# Patient Record
Sex: MALE | Race: WHITE | NOT HISPANIC OR LATINO | Employment: OTHER | URBAN - METROPOLITAN AREA
[De-identification: names, ages, dates, MRNs, and addresses within clinical notes are randomized per-mention and may not be internally consistent; named-entity substitution may affect disease eponyms.]

---

## 2022-10-12 ENCOUNTER — HOSPITAL ENCOUNTER (INPATIENT)
Facility: MEDICAL CENTER | Age: 67
LOS: 8 days | DRG: 331 | End: 2022-10-20
Attending: EMERGENCY MEDICINE | Admitting: SURGERY
Payer: MEDICARE

## 2022-10-12 DIAGNOSIS — Z93.2 ILEOSTOMY IN PLACE (HCC): ICD-10-CM

## 2022-10-12 DIAGNOSIS — K51.019 ULCERATIVE PANCOLITIS WITH COMPLICATION (HCC): ICD-10-CM

## 2022-10-12 DIAGNOSIS — K56.609 LARGE BOWEL OBSTRUCTION (HCC): ICD-10-CM

## 2022-10-12 DIAGNOSIS — G89.18 POST-OP PAIN: ICD-10-CM

## 2022-10-12 PROBLEM — K51.919 ULCERATIVE COLITIS WITH COMPLICATION (HCC): Status: ACTIVE | Noted: 2022-10-12

## 2022-10-12 LAB
ALBUMIN SERPL BCP-MCNC: 3.7 G/DL (ref 3.2–4.9)
ALBUMIN/GLOB SERPL: 1 G/DL
ALP SERPL-CCNC: 122 U/L (ref 30–99)
ALT SERPL-CCNC: 166 U/L (ref 2–50)
ANION GAP SERPL CALC-SCNC: 14 MMOL/L (ref 7–16)
APPEARANCE UR: CLEAR
AST SERPL-CCNC: 49 U/L (ref 12–45)
BACTERIA #/AREA URNS HPF: NEGATIVE /HPF
BASOPHILS # BLD AUTO: 0.2 % (ref 0–1.8)
BASOPHILS # BLD: 0.02 K/UL (ref 0–0.12)
BILIRUB SERPL-MCNC: 1.5 MG/DL (ref 0.1–1.5)
BILIRUB UR QL STRIP.AUTO: ABNORMAL
BLOOD CULTURE HOLD CXBCH: NORMAL
BUN SERPL-MCNC: 27 MG/DL (ref 8–22)
CALCIUM SERPL-MCNC: 9.3 MG/DL (ref 8.5–10.5)
CHLORIDE SERPL-SCNC: 100 MMOL/L (ref 96–112)
CO2 SERPL-SCNC: 22 MMOL/L (ref 20–33)
COLOR UR: ABNORMAL
CREAT SERPL-MCNC: 0.84 MG/DL (ref 0.5–1.4)
EOSINOPHIL # BLD AUTO: 0.01 K/UL (ref 0–0.51)
EOSINOPHIL NFR BLD: 0.1 % (ref 0–6.9)
EPI CELLS #/AREA URNS HPF: NEGATIVE /HPF
ERYTHROCYTE [DISTWIDTH] IN BLOOD BY AUTOMATED COUNT: 46 FL (ref 35.9–50)
GFR SERPLBLD CREATININE-BSD FMLA CKD-EPI: 95 ML/MIN/1.73 M 2
GLOBULIN SER CALC-MCNC: 3.6 G/DL (ref 1.9–3.5)
GLUCOSE SERPL-MCNC: 106 MG/DL (ref 65–99)
GLUCOSE UR STRIP.AUTO-MCNC: NEGATIVE MG/DL
HCT VFR BLD AUTO: 43.7 % (ref 42–52)
HGB BLD-MCNC: 15.2 G/DL (ref 14–18)
HYALINE CASTS #/AREA URNS LPF: ABNORMAL /LPF
IMM GRANULOCYTES # BLD AUTO: 0.04 K/UL (ref 0–0.11)
IMM GRANULOCYTES NFR BLD AUTO: 0.5 % (ref 0–0.9)
KETONES UR STRIP.AUTO-MCNC: 15 MG/DL
LEUKOCYTE ESTERASE UR QL STRIP.AUTO: ABNORMAL
LIPASE SERPL-CCNC: 58 U/L (ref 11–82)
LYMPHOCYTES # BLD AUTO: 1.29 K/UL (ref 1–4.8)
LYMPHOCYTES NFR BLD: 14.9 % (ref 22–41)
MCH RBC QN AUTO: 31.8 PG (ref 27–33)
MCHC RBC AUTO-ENTMCNC: 34.8 G/DL (ref 33.7–35.3)
MCV RBC AUTO: 91.4 FL (ref 81.4–97.8)
MICRO URNS: ABNORMAL
MONOCYTES # BLD AUTO: 1.29 K/UL (ref 0–0.85)
MONOCYTES NFR BLD AUTO: 14.9 % (ref 0–13.4)
NEUTROPHILS # BLD AUTO: 6 K/UL (ref 1.82–7.42)
NEUTROPHILS NFR BLD: 69.4 % (ref 44–72)
NITRITE UR QL STRIP.AUTO: NEGATIVE
NRBC # BLD AUTO: 0 K/UL
NRBC BLD-RTO: 0 /100 WBC
PH UR STRIP.AUTO: 5 [PH] (ref 5–8)
PLATELET # BLD AUTO: 394 K/UL (ref 164–446)
PMV BLD AUTO: 9.6 FL (ref 9–12.9)
POTASSIUM SERPL-SCNC: 4.4 MMOL/L (ref 3.6–5.5)
PROT SERPL-MCNC: 7.3 G/DL (ref 6–8.2)
PROT UR QL STRIP: NEGATIVE MG/DL
RBC # BLD AUTO: 4.78 M/UL (ref 4.7–6.1)
RBC # URNS HPF: ABNORMAL /HPF
RBC UR QL AUTO: NEGATIVE
SODIUM SERPL-SCNC: 136 MMOL/L (ref 135–145)
SP GR UR STRIP.AUTO: 1.03
UROBILINOGEN UR STRIP.AUTO-MCNC: 1 MG/DL
WBC # BLD AUTO: 8.7 K/UL (ref 4.8–10.8)
WBC #/AREA URNS HPF: ABNORMAL /HPF

## 2022-10-12 PROCEDURE — 81001 URINALYSIS AUTO W/SCOPE: CPT

## 2022-10-12 PROCEDURE — 700111 HCHG RX REV CODE 636 W/ 250 OVERRIDE (IP): Performed by: SURGERY

## 2022-10-12 PROCEDURE — 36415 COLL VENOUS BLD VENIPUNCTURE: CPT

## 2022-10-12 PROCEDURE — 770001 HCHG ROOM/CARE - MED/SURG/GYN PRIV*

## 2022-10-12 PROCEDURE — 99285 EMERGENCY DEPT VISIT HI MDM: CPT

## 2022-10-12 PROCEDURE — 85025 COMPLETE CBC W/AUTO DIFF WBC: CPT

## 2022-10-12 PROCEDURE — 700105 HCHG RX REV CODE 258: Performed by: SURGERY

## 2022-10-12 PROCEDURE — 700105 HCHG RX REV CODE 258: Performed by: EMERGENCY MEDICINE

## 2022-10-12 PROCEDURE — 80053 COMPREHEN METABOLIC PANEL: CPT

## 2022-10-12 PROCEDURE — 83690 ASSAY OF LIPASE: CPT

## 2022-10-12 RX ORDER — PREDNISONE 20 MG/1
20 TABLET ORAL 2 TIMES DAILY
Status: DISCONTINUED | OUTPATIENT
Start: 2022-10-13 | End: 2022-10-15

## 2022-10-12 RX ORDER — OXYCODONE HYDROCHLORIDE 5 MG/1
2.5 TABLET ORAL
Status: DISCONTINUED | OUTPATIENT
Start: 2022-10-12 | End: 2022-10-15

## 2022-10-12 RX ORDER — PREDNISONE 20 MG/1
20 TABLET ORAL 2 TIMES DAILY
Status: ON HOLD | COMMUNITY
End: 2022-10-20

## 2022-10-12 RX ORDER — OXYCODONE HYDROCHLORIDE 5 MG/1
5 TABLET ORAL
Status: DISCONTINUED | OUTPATIENT
Start: 2022-10-12 | End: 2022-10-15

## 2022-10-12 RX ORDER — PREDNISONE 20 MG/1
40 TABLET ORAL DAILY
Status: DISCONTINUED | OUTPATIENT
Start: 2022-10-12 | End: 2022-10-12

## 2022-10-12 RX ORDER — ONDANSETRON 2 MG/ML
4 INJECTION INTRAMUSCULAR; INTRAVENOUS EVERY 4 HOURS PRN
Status: DISCONTINUED | OUTPATIENT
Start: 2022-10-12 | End: 2022-10-15

## 2022-10-12 RX ORDER — TRAMADOL HYDROCHLORIDE 50 MG/1
50 TABLET ORAL 4 TIMES DAILY PRN
Status: ON HOLD | COMMUNITY
End: 2022-10-20

## 2022-10-12 RX ORDER — MESALAMINE 800 MG/1
1600 TABLET, DELAYED RELEASE ORAL 2 TIMES DAILY
Status: ON HOLD | COMMUNITY
End: 2022-10-20

## 2022-10-12 RX ORDER — MERCAPTOPURINE 50 MG/1
50 TABLET ORAL
Status: ON HOLD | COMMUNITY
End: 2022-10-20

## 2022-10-12 RX ORDER — ACETAMINOPHEN 325 MG/1
650 TABLET ORAL EVERY 4 HOURS PRN
Status: DISCONTINUED | OUTPATIENT
Start: 2022-10-12 | End: 2022-10-15

## 2022-10-12 RX ORDER — CEPHALEXIN 500 MG/1
500 CAPSULE ORAL 4 TIMES DAILY
Status: ON HOLD | COMMUNITY
End: 2022-10-20

## 2022-10-12 RX ORDER — SODIUM CHLORIDE 9 MG/ML
INJECTION, SOLUTION INTRAVENOUS CONTINUOUS
Status: DISCONTINUED | OUTPATIENT
Start: 2022-10-12 | End: 2022-10-12

## 2022-10-12 RX ORDER — IBUPROFEN 600 MG/1
600 TABLET ORAL EVERY 6 HOURS PRN
Status: DISCONTINUED | OUTPATIENT
Start: 2022-10-12 | End: 2022-10-15

## 2022-10-12 RX ORDER — POLYETHYLENE GLYCOL 3350 17 G/17G
17 POWDER, FOR SOLUTION ORAL DAILY
Status: ON HOLD | COMMUNITY
End: 2022-10-20

## 2022-10-12 RX ORDER — SODIUM CHLORIDE, SODIUM LACTATE, POTASSIUM CHLORIDE, CALCIUM CHLORIDE 600; 310; 30; 20 MG/100ML; MG/100ML; MG/100ML; MG/100ML
INJECTION, SOLUTION INTRAVENOUS CONTINUOUS
Status: DISCONTINUED | OUTPATIENT
Start: 2022-10-12 | End: 2022-10-13

## 2022-10-12 RX ORDER — HYDROMORPHONE HYDROCHLORIDE 1 MG/ML
0.25 INJECTION, SOLUTION INTRAMUSCULAR; INTRAVENOUS; SUBCUTANEOUS
Status: DISCONTINUED | OUTPATIENT
Start: 2022-10-12 | End: 2022-10-15

## 2022-10-12 RX ORDER — SENNOSIDES A AND B 8.6 MG/1
8.6 TABLET, FILM COATED ORAL
Status: ON HOLD | COMMUNITY
End: 2022-10-20

## 2022-10-12 RX ADMIN — PREDNISONE 20 MG: 20 TABLET ORAL at 19:15

## 2022-10-12 RX ADMIN — SODIUM CHLORIDE: 9 INJECTION, SOLUTION INTRAVENOUS at 15:21

## 2022-10-12 RX ADMIN — SODIUM CHLORIDE, POTASSIUM CHLORIDE, SODIUM LACTATE AND CALCIUM CHLORIDE: 600; 310; 30; 20 INJECTION, SOLUTION INTRAVENOUS at 15:34

## 2022-10-12 ASSESSMENT — LIFESTYLE VARIABLES
TOTAL SCORE: 0
TOTAL SCORE: 0
HAVE PEOPLE ANNOYED YOU BY CRITICIZING YOUR DRINKING: NO
CONSUMPTION TOTAL: NEGATIVE
EVER HAD A DRINK FIRST THING IN THE MORNING TO STEADY YOUR NERVES TO GET RID OF A HANGOVER: NO
AVERAGE NUMBER OF DAYS PER WEEK YOU HAVE A DRINK CONTAINING ALCOHOL: 0
EVER FELT BAD OR GUILTY ABOUT YOUR DRINKING: NO
ON A TYPICAL DAY WHEN YOU DRINK ALCOHOL HOW MANY DRINKS DO YOU HAVE: 0
TOTAL SCORE: 0
HAVE YOU EVER FELT YOU SHOULD CUT DOWN ON YOUR DRINKING: NO
HOW MANY TIMES IN THE PAST YEAR HAVE YOU HAD 5 OR MORE DRINKS IN A DAY: 0
ALCOHOL_USE: NO
DOES PATIENT WANT TO STOP DRINKING: NO

## 2022-10-12 ASSESSMENT — COGNITIVE AND FUNCTIONAL STATUS - GENERAL
SUGGESTED CMS G CODE MODIFIER MOBILITY: CH
MOBILITY SCORE: 24
DAILY ACTIVITIY SCORE: 24
SUGGESTED CMS G CODE MODIFIER DAILY ACTIVITY: CH

## 2022-10-12 ASSESSMENT — PATIENT HEALTH QUESTIONNAIRE - PHQ9
2. FEELING DOWN, DEPRESSED, IRRITABLE, OR HOPELESS: NOT AT ALL
SUM OF ALL RESPONSES TO PHQ9 QUESTIONS 1 AND 2: 0
1. LITTLE INTEREST OR PLEASURE IN DOING THINGS: NOT AT ALL

## 2022-10-12 ASSESSMENT — PAIN DESCRIPTION - PAIN TYPE: TYPE: ACUTE PAIN

## 2022-10-12 NOTE — H&P
10/12:  CC: Ulcerative Colitis with Large Bowel Obstruction    HPI: 67y M presents the the ED after being seen in the office earlier today for ulcerative colitis with large bowel obstruction.  Since he was last seen by me he has had a worsening in his ulcerative colitis. He was recently admitted to the hospital and found to have a partial larg bowel obstruction with 2 strictures in the mid and proximal rectum. These were small and narrow and involved a large area of erythematous tissue. Colonoscopy was able to be completed though and large volume colonic lavage was completed. Pt then left hospital and came to [place] to be seen by ColoRectal Surgery. He is not taking any blood thinners at this time. He continues to have a great deal of abdominal bloating and has not had formes stools in over a week.  He does get some intermittent loose stools out still and is only amanda to take in minimal PO.    He is currently on Prednisone 40mg PO qday.     PMHx: Ulcerative colitis, Severe Arthritis    PSHx: Cataract Surgery    Meds: see Med Rec, no anticoagulation    NKDA    FamHx: no colon/rectal cancers, no other pertinent family history    SocHx: No Tob/Drugs, occasional EtOH      ROS: negative except as above    Consitutional- above  HEENT- no visual changes, no sneezing or runny nose  Skin- no rashes or itching  Cardiovascular- no chest pain or palpatations  Respiratory- no SOB or cough  GI- above  - no dysurea  Neuro- no weakness or syncope  Musculoskeletal- no muscle or joint pain  Heme- no bleeding or bruising  Lymphatic- no enlarged nodes or previous splenectomy  Endocrine- No sweating or heat/cold intolerance  Allergy- No asthma or hives  Psychiatric- no depression or anxiety        Physical Exam:   AFVSS  A@O x3, NAD  NCAT, no scleral icterus  Neck nontender, no lymphadenopathy  Normal respiratory effort, no chest wall masses  RRR, 2+ pulses  Abdomen soft, no peritonitis, no masses, moderately distended  Extremities warm  and well perfused  No skin rashes or lesions    Labs: pending    Radiology: n/a    A/P: 67y M with Severe Ulcerative Colitis complicated by rectal stricture and large bowel obstruction. At this point symptoms are severe and ongoing and recent endoscopy did not reveal any area amenable to dilation. I explained that at this point he has reached medical treatment failure and is going to need surgery. His options are to do a complete proctocolectomy with end ileostomy vs a subtotal proctocolectomy with end ileostomy and possible return to OR down the road for J-pouch creation. We had a long discussion about risks/benefits/alternatives of both and he feels that life with the J-pouch sounds too difficult at his age and he prefers a single definitive surgery. He understands this will result in a permanent ileostomy placement. Will attempt to do his surgery laparoscopically but may require an open operation given state of distension.   Given his poor PO intake and distended abdomen, I am recommending immediate hospital admission. Will plan on surgery this upcoming weekend.

## 2022-10-12 NOTE — ED PROVIDER NOTES
"ED Provider Note    CHIEF COMPLAINT  Chief Complaint   Patient presents with    Sent by MD Mcadams sent by Dr. Burleson for admissio.  PEr pt report, pt is experiencing a bowel obstruction.       HPI  Zain Townsend is a 67 y.o. male who is sent in by his surgeon for evaluation of a large bowel obstruction.  This patient has a history of psoriatic arthritis and ulcerative colitis, he has been experiencing pain and bloating with no bowel movements over the past couple of weeks.  He has had several CT scans and even a colonoscopy.  He was evaluated today by Dr. Samaniego, colorectal surgeon and was instructed to go to emergency department, the patient will undergo operative intervention of his bowel obstruction.  He states that at this point his pain is about a 6 out of 10.  He has not been vomiting, no fever, he offers no other acute complaints at this time    REVIEW OF SYSTEMS  Negative for fever, rash, chest pain, dyspnea, headache, back pain. All other systems are negative.     PAST MEDICAL HISTORY  Ulcerative colitis  Psoriatic arthritis    FAMILY HISTORY  History reviewed. No pertinent family history.    SOCIAL HISTORY  Social History     Tobacco Use    Smoking status: Never   Vaping Use    Vaping Use: Never used   Substance Use Topics    Alcohol use: Never    Drug use: Never       SURGICAL HISTORY  History reviewed. No pertinent surgical history.    CURRENT MEDICATIONS  I personally reviewed the medication list in the charting documentation.     ALLERGIES  Allergies   Allergen Reactions    Hydrocodone     Morphine     Sulfa Drugs        MEDICAL RECORD  I have reviewed patient's medical record and pertinent results are listed above.      PHYSICAL EXAM  VITAL SIGNS: /84   Pulse 90   Temp 36.3 °C (97.4 °F) (Oral)   Resp 16   Ht 1.727 m (5' 8\")   Wt 64 kg (141 lb 1.5 oz)   SpO2 96%   BMI 21.45 kg/m²    Constitutional: Well appearing patient in no acute distress.  Awake and alert, not toxic nor ill in " appearance.  HENT: Normocephalic, no obvious evidence of acute trauma.  Eyes: No scleral icterus. Normal conjunctiva   Neck: Comfortable movement without any obvious restriction in the range of motion.  Cardiovascular: Upon ascultation I appreciate a regular heart rhythm and a normal rate.   Thorax & Lungs: Normal nonlabored respirations.  Upon application of the stethoscope for auscultation I find there to be no associated chest wall tenderness.  I appreciate no wheezing, rhonchi or rales. There is normal air movement.    Abdomen: The abdomen is visibly distended moderately so.  There is mild generalized tenderness without focality.  Skin: The exposed portions of skin reveal no obvious rash or other abnormalities.  Extremities/Musculoskeletal: Chronic deformities of the hands  Neurologic: Alert & oriented. No focal deficits observed.   Psychiatric: Normal affect appropriate for the clinical situation.    DIAGNOSTIC STUDIES / PROCEDURES    LABS/EKGs     Results for orders placed or performed during the hospital encounter of 10/12/22   CBC WITH DIFFERENTIAL   Result Value Ref Range    WBC 8.7 4.8 - 10.8 K/uL    RBC 4.78 4.70 - 6.10 M/uL    Hemoglobin 15.2 14.0 - 18.0 g/dL    Hematocrit 43.7 42.0 - 52.0 %    MCV 91.4 81.4 - 97.8 fL    MCH 31.8 27.0 - 33.0 pg    MCHC 34.8 33.7 - 35.3 g/dL    RDW 46.0 35.9 - 50.0 fL    Platelet Count 394 164 - 446 K/uL    MPV 9.6 9.0 - 12.9 fL    Neutrophils-Polys 69.40 44.00 - 72.00 %    Lymphocytes 14.90 (L) 22.00 - 41.00 %    Monocytes 14.90 (H) 0.00 - 13.40 %    Eosinophils 0.10 0.00 - 6.90 %    Basophils 0.20 0.00 - 1.80 %    Immature Granulocytes 0.50 0.00 - 0.90 %    Nucleated RBC 0.00 /100 WBC    Neutrophils (Absolute) 6.00 1.82 - 7.42 K/uL    Lymphs (Absolute) 1.29 1.00 - 4.80 K/uL    Monos (Absolute) 1.29 (H) 0.00 - 0.85 K/uL    Eos (Absolute) 0.01 0.00 - 0.51 K/uL    Baso (Absolute) 0.02 0.00 - 0.12 K/uL    Immature Granulocytes (abs) 0.04 0.00 - 0.11 K/uL    NRBC (Absolute)  0.00 K/uL   COMP METABOLIC PANEL   Result Value Ref Range    Sodium 136 135 - 145 mmol/L    Potassium 4.4 3.6 - 5.5 mmol/L    Chloride 100 96 - 112 mmol/L    Co2 22 20 - 33 mmol/L    Anion Gap 14.0 7.0 - 16.0    Glucose 106 (H) 65 - 99 mg/dL    Bun 27 (H) 8 - 22 mg/dL    Creatinine 0.84 0.50 - 1.40 mg/dL    Calcium 9.3 8.5 - 10.5 mg/dL    AST(SGOT) 49 (H) 12 - 45 U/L    ALT(SGPT) 166 (H) 2 - 50 U/L    Alkaline Phosphatase 122 (H) 30 - 99 U/L    Total Bilirubin 1.5 0.1 - 1.5 mg/dL    Albumin 3.7 3.2 - 4.9 g/dL    Total Protein 7.3 6.0 - 8.2 g/dL    Globulin 3.6 (H) 1.9 - 3.5 g/dL    A-G Ratio 1.0 g/dL   LIPASE   Result Value Ref Range    Lipase 58 11 - 82 U/L   URINALYSIS    Specimen: Urine, Clean Catch   Result Value Ref Range    Micro Urine Req Microscopic    ESTIMATED GFR   Result Value Ref Range    GFR (CKD-EPI) 95 >60 mL/min/1.73 m 2   Blood Culture,Hold   Result Value Ref Range    Blood Culture Hold Collected           COURSE & MEDICAL DECISION MAKING  I have reviewed any medical record information, laboratory studies and radiographic results as noted above.    Encounter Summary: This is a very pleasant 67 y.o. male who unfortunately required evaluation in the emergency department today with large bowel obstruction, sent in by his colorectal surgeon.  History of ulcerative colitis.  No evidence of peritonitis on exam.  Blood work will be obtained.  Consulted Dr. Samaniego, his colorectal surgeon and the patient is admitted to the hospital in guarded condition      DISPOSITION: Admit in guarded condition      FINAL IMPRESSION  1. Large bowel obstruction (HCC)           This dictation was created using voice recognition software. The accuracy of the dictation is limited to the abilities of the software. I expect there may be some errors of grammar and possibly content. The nursing notes were reviewed and certain aspects of this information were incorporated into this note.    Electronically signed by: Jomar Tsai  MARY ANN Redding, 10/12/2022 3:15 PM

## 2022-10-12 NOTE — ED NOTES
Med Rec complete per patient  Allergies reviewed  Patient tried several OTC medications for constipation but stopped them because they did not work  Patient has Prednisone at bedside

## 2022-10-12 NOTE — ED TRIAGE NOTES
Chief Complaint   Patient presents with    Sent by MD     Pt sent by Dr. Burleson for admissio.  PEr pt report, pt is experiencing a bowel obstruction.     Pt reports last BM was 9/25/22.  He denies any vomiting.  Recent hospitalization in [facility] last week for same reasons.

## 2022-10-12 NOTE — ED NOTES
Pt able to ambulate to room from lobby at slow pace due to pain. Pt changing into gown at this time.

## 2022-10-13 LAB
ANION GAP SERPL CALC-SCNC: 13 MMOL/L (ref 7–16)
BUN SERPL-MCNC: 24 MG/DL (ref 8–22)
CALCIUM SERPL-MCNC: 8.3 MG/DL (ref 8.5–10.5)
CHLORIDE SERPL-SCNC: 101 MMOL/L (ref 96–112)
CO2 SERPL-SCNC: 23 MMOL/L (ref 20–33)
CREAT SERPL-MCNC: 0.66 MG/DL (ref 0.5–1.4)
ERYTHROCYTE [DISTWIDTH] IN BLOOD BY AUTOMATED COUNT: 47.2 FL (ref 35.9–50)
GFR SERPLBLD CREATININE-BSD FMLA CKD-EPI: 102 ML/MIN/1.73 M 2
GLUCOSE SERPL-MCNC: 98 MG/DL (ref 65–99)
HCT VFR BLD AUTO: 40.1 % (ref 42–52)
HGB BLD-MCNC: 13.4 G/DL (ref 14–18)
MAGNESIUM SERPL-MCNC: 2.2 MG/DL (ref 1.5–2.5)
MCH RBC QN AUTO: 31.4 PG (ref 27–33)
MCHC RBC AUTO-ENTMCNC: 33.4 G/DL (ref 33.7–35.3)
MCV RBC AUTO: 93.9 FL (ref 81.4–97.8)
PLATELET # BLD AUTO: 292 K/UL (ref 164–446)
PMV BLD AUTO: 10.3 FL (ref 9–12.9)
POTASSIUM SERPL-SCNC: 4.5 MMOL/L (ref 3.6–5.5)
RBC # BLD AUTO: 4.27 M/UL (ref 4.7–6.1)
SODIUM SERPL-SCNC: 137 MMOL/L (ref 135–145)
WBC # BLD AUTO: 5.5 K/UL (ref 4.8–10.8)

## 2022-10-13 PROCEDURE — 770001 HCHG ROOM/CARE - MED/SURG/GYN PRIV*

## 2022-10-13 PROCEDURE — 36415 COLL VENOUS BLD VENIPUNCTURE: CPT

## 2022-10-13 PROCEDURE — 85027 COMPLETE CBC AUTOMATED: CPT

## 2022-10-13 PROCEDURE — 700101 HCHG RX REV CODE 250: Performed by: SURGERY

## 2022-10-13 PROCEDURE — 80048 BASIC METABOLIC PNL TOTAL CA: CPT

## 2022-10-13 PROCEDURE — 700111 HCHG RX REV CODE 636 W/ 250 OVERRIDE (IP): Performed by: SURGERY

## 2022-10-13 PROCEDURE — 83735 ASSAY OF MAGNESIUM: CPT

## 2022-10-13 RX ORDER — DEXTROSE MONOHYDRATE, SODIUM CHLORIDE, AND POTASSIUM CHLORIDE 50; 1.49; 4.5 G/1000ML; G/1000ML; G/1000ML
INJECTION, SOLUTION INTRAVENOUS CONTINUOUS
Status: DISCONTINUED | OUTPATIENT
Start: 2022-10-13 | End: 2022-10-19

## 2022-10-13 RX ADMIN — PREDNISONE 20 MG: 20 TABLET ORAL at 19:00

## 2022-10-13 RX ADMIN — POTASSIUM CHLORIDE, DEXTROSE MONOHYDRATE AND SODIUM CHLORIDE: 150; 5; 450 INJECTION, SOLUTION INTRAVENOUS at 13:55

## 2022-10-13 RX ADMIN — PREDNISONE 20 MG: 20 TABLET ORAL at 08:39

## 2022-10-13 ASSESSMENT — PAIN DESCRIPTION - PAIN TYPE
TYPE: ACUTE PAIN

## 2022-10-13 NOTE — CARE PLAN
The patient is Stable - Low risk of patient condition declining or worsening    Shift Goals  Clinical Goals: pain control, increase mobility  Patient Goals: comfort, walk  Family Goals: no family at bedside    Progress made toward(s) clinical / shift goals: Pt states no pain. Pt OOB walking in hallway. Pt OOB to restroom. IVF infusing appropriately.     Patient is not progressing towards the following goals: Awaiting return of normal bowel function.

## 2022-10-13 NOTE — PROGRESS NOTES
4 Eyes Skin Assessment Completed by ROXANNE Tan and ROXANNE Cardona.    Head WDL  Ears WDL  Nose WDL  Mouth WDL  Neck WDL  Breast/Chest WDL  Shoulder Blades WDL  Spine WDL  (R) Arm/Elbow/Hand Swelling and contractures of fingers.  (L) Arm/Elbow/Hand Swelling and contractures of fingers.  Abdomen distension.  Groin - Pt declined assessment.  Scrotum/Coccyx/Buttocks - Pt declined assessment  (R) Leg WDL  (L) Leg WDL  (R) Heel/Foot/Toe dry and calloused  (L) Heel/Foot/Toe dry and calloused          Devices In Places Blood Pressure Cuff and Pulse Ox      Interventions In Place Pillows and Pressure Redistribution Mattress    Possible Skin Injury No    Pictures Uploaded Into Epic N/A  Wound Consult Placed N/A  RN Wound Prevention Protocol Ordered No

## 2022-10-13 NOTE — PROGRESS NOTES
Received report from ER RN at 9171. Pt arrived to unit via gurney with transport at 1035.  Assessment complete.  A&O x 4. Patient calls appropriately.  Patient ambulates with standby assist.   Patient has 6/10 pain. Pain managed with prescribed medications per MAR and rest.  Denies N&V. Pt NPO with ice chips at this time.  Skin per flowsheets.  + void, + flatus, - BM.  Patient denies SOB on room air.    Patient pleasant and cooperative throughout assessment.  Reviewed plan of care with patient, pt verbalizes understanding. Call light and personal belongings with in reach. Hourly rounding in place. All needs met at this time.

## 2022-10-13 NOTE — PROGRESS NOTES
10/13:  Pt seen and examined, admitted yesterday for UC with obstruction.  Overnight, he is doing well with IVF in place, ambulating, pain controlled, passing some liquid stool.  He denies any n/v at this time as well.      Exam benign, moderate distension, no focal tenderness or peritonitis.      Plan is to proceed to OR at next available time, likely this Saturday.  Risks/benefits/alternatives of procedure discussed with him.  Plan will be for a total proctocolectomy with end ileostomy placement as pt accepts permanent ileostomy and wants a single stage definitive surgery.  Will attempt a robotic approach, particularly for pelvic dissection, but may require an open case given partially obstructed state.  Continue NPO, IVF for now.

## 2022-10-13 NOTE — PROGRESS NOTES
Assumed care of patient at 0645. Bedside report received. Assessment complete.    AA&Ox4. Denies SOB. Pt on room air.    Reporting 0/10 pain. Declined pharmacologic intervention at this time.   Educated patient regarding pharmacologic and non pharmacologic modalities for pain management.    Skin per flow sheets.    Pt NPO with ice chips.     + void- pt up to RR. Last BM 9/25/2022 per pt.    Pt ambulates x self w/ steady gait.    Plan of care discussed, all questions answered. Educated on the importance of calling before getting OOB and pt verbalizes understanding. Educated regarding importance of oral care. Oral care kit at bedside. Call light is within reach, treaded slipper socks on, bed in lowest/ locked position, hourly rounding in place, all needs met at this time.    Layla Carias R.N.

## 2022-10-13 NOTE — CARE PLAN
The patient is Stable - Low risk of patient condition declining or worsening    Shift Goals  Clinical Goals: orient to unit; pain control; comfort  Patient Goals: pain control; surgery anticipation; rest    Progress made toward(s) clinical / shift goals:  Patient medicated per MAR. Skin integrity assessed with 2 RN skin check. Education provided. Non-pharmacologic comfort measures implemented.    Problem: Fall Risk  Goal: Patient will remain free from falls  Outcome: Progressing     Problem: Knowledge Deficit - Standard  Goal: Patient and family/care givers will demonstrate understanding of plan of care, disease process/condition, diagnostic tests and medications  Outcome: Progressing     Problem: Pain - Standard  Goal: Alleviation of pain or a reduction in pain to the patient’s comfort goal  Outcome: Progressing

## 2022-10-14 PROCEDURE — 700101 HCHG RX REV CODE 250: Performed by: SURGERY

## 2022-10-14 PROCEDURE — 700111 HCHG RX REV CODE 636 W/ 250 OVERRIDE (IP): Performed by: SURGERY

## 2022-10-14 PROCEDURE — A9270 NON-COVERED ITEM OR SERVICE: HCPCS | Performed by: SURGERY

## 2022-10-14 PROCEDURE — 700102 HCHG RX REV CODE 250 W/ 637 OVERRIDE(OP): Performed by: SURGERY

## 2022-10-14 PROCEDURE — 770001 HCHG ROOM/CARE - MED/SURG/GYN PRIV*

## 2022-10-14 RX ADMIN — POTASSIUM CHLORIDE, DEXTROSE MONOHYDRATE AND SODIUM CHLORIDE: 150; 5; 450 INJECTION, SOLUTION INTRAVENOUS at 13:21

## 2022-10-14 RX ADMIN — PREDNISONE 20 MG: 20 TABLET ORAL at 08:40

## 2022-10-14 RX ADMIN — PREDNISONE 20 MG: 20 TABLET ORAL at 19:37

## 2022-10-14 RX ADMIN — ACETAMINOPHEN 650 MG: 325 TABLET, FILM COATED ORAL at 00:33

## 2022-10-14 RX ADMIN — ACETAMINOPHEN 650 MG: 325 TABLET, FILM COATED ORAL at 22:06

## 2022-10-14 RX ADMIN — POTASSIUM CHLORIDE, DEXTROSE MONOHYDRATE AND SODIUM CHLORIDE: 150; 5; 450 INJECTION, SOLUTION INTRAVENOUS at 00:30

## 2022-10-14 RX ADMIN — ACETAMINOPHEN 650 MG: 325 TABLET, FILM COATED ORAL at 13:36

## 2022-10-14 ASSESSMENT — PAIN DESCRIPTION - PAIN TYPE: TYPE: ACUTE PAIN

## 2022-10-14 NOTE — CARE PLAN
The patient is Stable - Low risk of patient condition declining or worsening    Shift Goals  Clinical Goals: monitor for pain, increase mobility  Patient Goals: comfort, walk halls  Family Goals: no family at bedside    Progress made toward(s) clinical / shift goals: Pt states pain is controlled with tylenol and alternation of ice and heat. Awaiting OR. Pt OOB and ambulates halls frequently.    Patient is not progressing towards the following goals: N/A

## 2022-10-14 NOTE — CARE PLAN
Shift Goals  Clinical Goals: pain control  Patient Goals: Comfort  Family Goals: no family at bedside    Progress made toward(s) clinical / shift goals:        Problem: Fall Risk  Goal: Patient will remain free from falls  Outcome: Progressing     Problem: Knowledge Deficit - Standard  Goal: Patient and family/care givers will demonstrate understanding of plan of care, disease process/condition, diagnostic tests and medications  Outcome: Progressing     Problem: Pain - Standard  Goal: Alleviation of pain or a reduction in pain to the patient’s comfort goal  Outcome: Progressing

## 2022-10-14 NOTE — PROGRESS NOTES
Assumed care of patient at 0645. Bedside report received. Assessment complete.    AA&Ox4. Denies SOB.    Reporting 0/10 pain. Declined pharmacologic intervention at this time.   Educated patient regarding pharmacologic and non pharmacologic modalities for pain management.    Skin per flow sheets.    Pt NPO. IVF in place.    + void. Last BM 9/25/2022.    Pt ambulates x self in hallway.    Plan of care discussed, all questions answered. Educated on the importance of calling before getting OOB and pt verbalizes understanding. Educated regarding importance of oral care. Oral care kit at bedside. Call light is within reach, treaded slipper socks on, bed in lowest/ locked position, hourly rounding in place, all needs met at this time.

## 2022-10-15 ENCOUNTER — ANESTHESIA EVENT (OUTPATIENT)
Dept: SURGERY | Facility: MEDICAL CENTER | Age: 67
DRG: 331 | End: 2022-10-15
Payer: MEDICARE

## 2022-10-15 ENCOUNTER — ANESTHESIA (OUTPATIENT)
Dept: SURGERY | Facility: MEDICAL CENTER | Age: 67
DRG: 331 | End: 2022-10-15
Payer: MEDICARE

## 2022-10-15 LAB — GLUCOSE BLD STRIP.AUTO-MCNC: 70 MG/DL (ref 65–99)

## 2022-10-15 PROCEDURE — 0DJD4ZZ INSPECTION OF LOWER INTESTINAL TRACT, PERCUTANEOUS ENDOSCOPIC APPROACH: ICD-10-PCS | Performed by: SURGERY

## 2022-10-15 PROCEDURE — 700101 HCHG RX REV CODE 250: Performed by: SURGERY

## 2022-10-15 PROCEDURE — 0DTP0ZZ RESECTION OF RECTUM, OPEN APPROACH: ICD-10-PCS | Performed by: SURGERY

## 2022-10-15 PROCEDURE — 00790 ANES IPER UPR ABD NOS: CPT | Performed by: ANESTHESIOLOGY

## 2022-10-15 PROCEDURE — C1765 ADHESION BARRIER: HCPCS | Performed by: SURGERY

## 2022-10-15 PROCEDURE — 770001 HCHG ROOM/CARE - MED/SURG/GYN PRIV*

## 2022-10-15 PROCEDURE — 160031 HCHG SURGERY MINUTES - 1ST 30 MINS LEVEL 5: Performed by: SURGERY

## 2022-10-15 PROCEDURE — 88341 IMHCHEM/IMCYTCHM EA ADD ANTB: CPT | Mod: 91

## 2022-10-15 PROCEDURE — 160009 HCHG ANES TIME/MIN: Performed by: SURGERY

## 2022-10-15 PROCEDURE — 64486 TAP BLOCK UNIL BY INJECTION: CPT | Mod: 59 | Performed by: ANESTHESIOLOGY

## 2022-10-15 PROCEDURE — 0DTE0ZZ RESECTION OF LARGE INTESTINE, OPEN APPROACH: ICD-10-PCS | Performed by: SURGERY

## 2022-10-15 PROCEDURE — 0D1B0Z4 BYPASS ILEUM TO CUTANEOUS, OPEN APPROACH: ICD-10-PCS | Performed by: SURGERY

## 2022-10-15 PROCEDURE — 76942 ECHO GUIDE FOR BIOPSY: CPT | Mod: 26 | Performed by: ANESTHESIOLOGY

## 2022-10-15 PROCEDURE — 160002 HCHG RECOVERY MINUTES (STAT): Performed by: SURGERY

## 2022-10-15 PROCEDURE — 160035 HCHG PACU - 1ST 60 MINS PHASE I: Performed by: SURGERY

## 2022-10-15 PROCEDURE — 700101 HCHG RX REV CODE 250: Performed by: ANESTHESIOLOGY

## 2022-10-15 PROCEDURE — 64488 TAP BLOCK BI INJECTION: CPT | Performed by: SURGERY

## 2022-10-15 PROCEDURE — 160036 HCHG PACU - EA ADDL 30 MINS PHASE I: Performed by: SURGERY

## 2022-10-15 PROCEDURE — 700111 HCHG RX REV CODE 636 W/ 250 OVERRIDE (IP): Performed by: SURGERY

## 2022-10-15 PROCEDURE — 160042 HCHG SURGERY MINUTES - EA ADDL 1 MIN LEVEL 5: Performed by: SURGERY

## 2022-10-15 PROCEDURE — 700111 HCHG RX REV CODE 636 W/ 250 OVERRIDE (IP): Performed by: ANESTHESIOLOGY

## 2022-10-15 PROCEDURE — 88342 IMHCHEM/IMCYTCHM 1ST ANTB: CPT

## 2022-10-15 PROCEDURE — 700105 HCHG RX REV CODE 258: Performed by: SURGERY

## 2022-10-15 PROCEDURE — 88309 TISSUE EXAM BY PATHOLOGIST: CPT

## 2022-10-15 PROCEDURE — 3E0T3BZ INTRODUCTION OF ANESTHETIC AGENT INTO PERIPHERAL NERVES AND PLEXI, PERCUTANEOUS APPROACH: ICD-10-PCS | Performed by: ANESTHESIOLOGY

## 2022-10-15 PROCEDURE — 700105 HCHG RX REV CODE 258: Performed by: ANESTHESIOLOGY

## 2022-10-15 PROCEDURE — 88360 TUMOR IMMUNOHISTOCHEM/MANUAL: CPT

## 2022-10-15 PROCEDURE — 82962 GLUCOSE BLOOD TEST: CPT

## 2022-10-15 PROCEDURE — 160048 HCHG OR STATISTICAL LEVEL 1-5: Performed by: SURGERY

## 2022-10-15 PROCEDURE — 110371 HCHG SHELL REV 272: Performed by: SURGERY

## 2022-10-15 RX ORDER — LABETALOL HYDROCHLORIDE 5 MG/ML
INJECTION, SOLUTION INTRAVENOUS PRN
Status: DISCONTINUED | OUTPATIENT
Start: 2022-10-15 | End: 2022-10-15 | Stop reason: SURG

## 2022-10-15 RX ORDER — DIPHENHYDRAMINE HYDROCHLORIDE 50 MG/ML
25 INJECTION INTRAMUSCULAR; INTRAVENOUS EVERY 6 HOURS PRN
Status: DISCONTINUED | OUTPATIENT
Start: 2022-10-15 | End: 2022-10-20 | Stop reason: HOSPADM

## 2022-10-15 RX ORDER — LIDOCAINE HYDROCHLORIDE 20 MG/ML
INJECTION, SOLUTION EPIDURAL; INFILTRATION; INTRACAUDAL; PERINEURAL PRN
Status: DISCONTINUED | OUTPATIENT
Start: 2022-10-15 | End: 2022-10-15 | Stop reason: SURG

## 2022-10-15 RX ORDER — HALOPERIDOL 5 MG/ML
1 INJECTION INTRAMUSCULAR EVERY 6 HOURS PRN
Status: DISCONTINUED | OUTPATIENT
Start: 2022-10-15 | End: 2022-10-20 | Stop reason: HOSPADM

## 2022-10-15 RX ORDER — ONDANSETRON 2 MG/ML
INJECTION INTRAMUSCULAR; INTRAVENOUS PRN
Status: DISCONTINUED | OUTPATIENT
Start: 2022-10-15 | End: 2022-10-15 | Stop reason: SURG

## 2022-10-15 RX ORDER — HYDROMORPHONE HYDROCHLORIDE 2 MG/ML
INJECTION, SOLUTION INTRAMUSCULAR; INTRAVENOUS; SUBCUTANEOUS PRN
Status: DISCONTINUED | OUTPATIENT
Start: 2022-10-15 | End: 2022-10-15 | Stop reason: SURG

## 2022-10-15 RX ORDER — MEPERIDINE HYDROCHLORIDE 25 MG/ML
6.25 INJECTION INTRAMUSCULAR; INTRAVENOUS; SUBCUTANEOUS
Status: DISCONTINUED | OUTPATIENT
Start: 2022-10-15 | End: 2022-10-15 | Stop reason: HOSPADM

## 2022-10-15 RX ORDER — HYDROMORPHONE HYDROCHLORIDE 1 MG/ML
0.2 INJECTION, SOLUTION INTRAMUSCULAR; INTRAVENOUS; SUBCUTANEOUS
Status: DISCONTINUED | OUTPATIENT
Start: 2022-10-15 | End: 2022-10-15 | Stop reason: HOSPADM

## 2022-10-15 RX ORDER — BUPIVACAINE HYDROCHLORIDE AND EPINEPHRINE 2.5; 5 MG/ML; UG/ML
INJECTION, SOLUTION EPIDURAL; INFILTRATION; INTRACAUDAL; PERINEURAL
Status: COMPLETED | OUTPATIENT
Start: 2022-10-15 | End: 2022-10-15

## 2022-10-15 RX ORDER — ACETAMINOPHEN 500 MG
1000 TABLET ORAL EVERY 6 HOURS PRN
Status: DISCONTINUED | OUTPATIENT
Start: 2022-10-20 | End: 2022-10-19

## 2022-10-15 RX ORDER — PREDNISONE 20 MG/1
20 TABLET ORAL DAILY
Status: DISCONTINUED | OUTPATIENT
Start: 2022-10-15 | End: 2022-10-19

## 2022-10-15 RX ORDER — ONDANSETRON 2 MG/ML
4 INJECTION INTRAMUSCULAR; INTRAVENOUS EVERY 4 HOURS PRN
Status: DISCONTINUED | OUTPATIENT
Start: 2022-10-15 | End: 2022-10-20 | Stop reason: HOSPADM

## 2022-10-15 RX ORDER — ACETAMINOPHEN 500 MG
1000 TABLET ORAL EVERY 6 HOURS
Status: DISCONTINUED | OUTPATIENT
Start: 2022-10-15 | End: 2022-10-19

## 2022-10-15 RX ORDER — IBUPROFEN 200 MG
800 TABLET ORAL 3 TIMES DAILY PRN
Status: DISCONTINUED | OUTPATIENT
Start: 2022-10-18 | End: 2022-10-20 | Stop reason: HOSPADM

## 2022-10-15 RX ORDER — CEFOTETAN DISODIUM 2 G/20ML
INJECTION, POWDER, FOR SOLUTION INTRAMUSCULAR; INTRAVENOUS PRN
Status: DISCONTINUED | OUTPATIENT
Start: 2022-10-15 | End: 2022-10-15 | Stop reason: SURG

## 2022-10-15 RX ORDER — DIPHENHYDRAMINE HCL 25 MG
25 TABLET ORAL EVERY 6 HOURS PRN
Status: DISCONTINUED | OUTPATIENT
Start: 2022-10-15 | End: 2022-10-20 | Stop reason: HOSPADM

## 2022-10-15 RX ORDER — SODIUM CHLORIDE, SODIUM LACTATE, POTASSIUM CHLORIDE, CALCIUM CHLORIDE 600; 310; 30; 20 MG/100ML; MG/100ML; MG/100ML; MG/100ML
INJECTION, SOLUTION INTRAVENOUS CONTINUOUS
Status: DISCONTINUED | OUTPATIENT
Start: 2022-10-15 | End: 2022-10-15 | Stop reason: HOSPADM

## 2022-10-15 RX ORDER — MAGNESIUM SULFATE HEPTAHYDRATE 40 MG/ML
INJECTION, SOLUTION INTRAVENOUS PRN
Status: DISCONTINUED | OUTPATIENT
Start: 2022-10-15 | End: 2022-10-15 | Stop reason: SURG

## 2022-10-15 RX ORDER — ROCURONIUM BROMIDE 10 MG/ML
INJECTION, SOLUTION INTRAVENOUS PRN
Status: DISCONTINUED | OUTPATIENT
Start: 2022-10-15 | End: 2022-10-15 | Stop reason: SURG

## 2022-10-15 RX ORDER — HYDROMORPHONE HYDROCHLORIDE 1 MG/ML
0.1 INJECTION, SOLUTION INTRAMUSCULAR; INTRAVENOUS; SUBCUTANEOUS
Status: DISCONTINUED | OUTPATIENT
Start: 2022-10-15 | End: 2022-10-15 | Stop reason: HOSPADM

## 2022-10-15 RX ORDER — SCOLOPAMINE TRANSDERMAL SYSTEM 1 MG/1
1 PATCH, EXTENDED RELEASE TRANSDERMAL
Status: DISCONTINUED | OUTPATIENT
Start: 2022-10-15 | End: 2022-10-20 | Stop reason: HOSPADM

## 2022-10-15 RX ORDER — CALCIUM CARBONATE 500 MG/1
500 TABLET, CHEWABLE ORAL
Status: DISCONTINUED | OUTPATIENT
Start: 2022-10-15 | End: 2022-10-20 | Stop reason: HOSPADM

## 2022-10-15 RX ORDER — LIDOCAINE HYDROCHLORIDE 40 MG/ML
SOLUTION TOPICAL PRN
Status: DISCONTINUED | OUTPATIENT
Start: 2022-10-15 | End: 2022-10-15 | Stop reason: SURG

## 2022-10-15 RX ORDER — SUCCINYLCHOLINE CHLORIDE 20 MG/ML
INJECTION INTRAMUSCULAR; INTRAVENOUS PRN
Status: DISCONTINUED | OUTPATIENT
Start: 2022-10-15 | End: 2022-10-15 | Stop reason: SURG

## 2022-10-15 RX ORDER — HALOPERIDOL 5 MG/ML
1 INJECTION INTRAMUSCULAR
Status: DISCONTINUED | OUTPATIENT
Start: 2022-10-15 | End: 2022-10-15 | Stop reason: HOSPADM

## 2022-10-15 RX ORDER — ENOXAPARIN SODIUM 100 MG/ML
40 INJECTION SUBCUTANEOUS DAILY
Status: DISCONTINUED | OUTPATIENT
Start: 2022-10-16 | End: 2022-10-20 | Stop reason: HOSPADM

## 2022-10-15 RX ORDER — SODIUM CHLORIDE, SODIUM LACTATE, POTASSIUM CHLORIDE, CALCIUM CHLORIDE 600; 310; 30; 20 MG/100ML; MG/100ML; MG/100ML; MG/100ML
INJECTION, SOLUTION INTRAVENOUS
Status: DISCONTINUED | OUTPATIENT
Start: 2022-10-15 | End: 2022-10-15 | Stop reason: SURG

## 2022-10-15 RX ORDER — DEXAMETHASONE SODIUM PHOSPHATE 4 MG/ML
4 INJECTION, SOLUTION INTRA-ARTICULAR; INTRALESIONAL; INTRAMUSCULAR; INTRAVENOUS; SOFT TISSUE
Status: DISCONTINUED | OUTPATIENT
Start: 2022-10-15 | End: 2022-10-20 | Stop reason: HOSPADM

## 2022-10-15 RX ORDER — BUPIVACAINE HYDROCHLORIDE AND EPINEPHRINE 5; 5 MG/ML; UG/ML
INJECTION, SOLUTION EPIDURAL; INTRACAUDAL; PERINEURAL
Status: DISCONTINUED | OUTPATIENT
Start: 2022-10-15 | End: 2022-10-15 | Stop reason: HOSPADM

## 2022-10-15 RX ORDER — TRAZODONE HYDROCHLORIDE 50 MG/1
50 TABLET ORAL NIGHTLY PRN
Status: DISCONTINUED | OUTPATIENT
Start: 2022-10-15 | End: 2022-10-20 | Stop reason: HOSPADM

## 2022-10-15 RX ORDER — KETAMINE HYDROCHLORIDE 50 MG/ML
INJECTION, SOLUTION INTRAMUSCULAR; INTRAVENOUS PRN
Status: DISCONTINUED | OUTPATIENT
Start: 2022-10-15 | End: 2022-10-15 | Stop reason: SURG

## 2022-10-15 RX ORDER — DEXAMETHASONE SODIUM PHOSPHATE 4 MG/ML
INJECTION, SOLUTION INTRA-ARTICULAR; INTRALESIONAL; INTRAMUSCULAR; INTRAVENOUS; SOFT TISSUE PRN
Status: DISCONTINUED | OUTPATIENT
Start: 2022-10-15 | End: 2022-10-15 | Stop reason: SURG

## 2022-10-15 RX ORDER — KETOROLAC TROMETHAMINE 30 MG/ML
15 INJECTION, SOLUTION INTRAMUSCULAR; INTRAVENOUS EVERY 6 HOURS
Status: DISPENSED | OUTPATIENT
Start: 2022-10-15 | End: 2022-10-18

## 2022-10-15 RX ORDER — HYDROMORPHONE HYDROCHLORIDE 1 MG/ML
0.4 INJECTION, SOLUTION INTRAMUSCULAR; INTRAVENOUS; SUBCUTANEOUS
Status: DISCONTINUED | OUTPATIENT
Start: 2022-10-15 | End: 2022-10-15 | Stop reason: HOSPADM

## 2022-10-15 RX ORDER — MIDAZOLAM HYDROCHLORIDE 1 MG/ML
INJECTION INTRAMUSCULAR; INTRAVENOUS PRN
Status: DISCONTINUED | OUTPATIENT
Start: 2022-10-15 | End: 2022-10-15 | Stop reason: SURG

## 2022-10-15 RX ORDER — ONDANSETRON 2 MG/ML
4 INJECTION INTRAMUSCULAR; INTRAVENOUS
Status: DISCONTINUED | OUTPATIENT
Start: 2022-10-15 | End: 2022-10-15 | Stop reason: HOSPADM

## 2022-10-15 RX ORDER — PHENYLEPHRINE HCL IN 0.9% NACL 0.5 MG/5ML
SYRINGE (ML) INTRAVENOUS PRN
Status: DISCONTINUED | OUTPATIENT
Start: 2022-10-15 | End: 2022-10-15 | Stop reason: SURG

## 2022-10-15 RX ADMIN — SUCCINYLCHOLINE CHLORIDE 80 MG: 20 INJECTION, SOLUTION INTRAMUSCULAR; INTRAVENOUS; PARENTERAL at 11:21

## 2022-10-15 RX ADMIN — ROCURONIUM BROMIDE 20 MG: 10 INJECTION, SOLUTION INTRAVENOUS at 11:49

## 2022-10-15 RX ADMIN — HYDROMORPHONE HYDROCHLORIDE 1 MG: 2 INJECTION INTRAMUSCULAR; INTRAVENOUS; SUBCUTANEOUS at 12:06

## 2022-10-15 RX ADMIN — FENTANYL CITRATE 50 MCG: 50 INJECTION, SOLUTION INTRAMUSCULAR; INTRAVENOUS at 11:20

## 2022-10-15 RX ADMIN — PROPOFOL 20 MG: 10 INJECTION, EMULSION INTRAVENOUS at 14:12

## 2022-10-15 RX ADMIN — CEFOTETAN DISODIUM 2 G: 2 INJECTION, POWDER, FOR SOLUTION INTRAMUSCULAR; INTRAVENOUS at 11:36

## 2022-10-15 RX ADMIN — MAGNESIUM SULFATE HEPTAHYDRATE 2 G: 40 INJECTION, SOLUTION INTRAVENOUS at 12:27

## 2022-10-15 RX ADMIN — HYDROMORPHONE HYDROCHLORIDE: 10 INJECTION, SOLUTION INTRAMUSCULAR; INTRAVENOUS; SUBCUTANEOUS at 16:13

## 2022-10-15 RX ADMIN — SODIUM CHLORIDE, POTASSIUM CHLORIDE, SODIUM LACTATE AND CALCIUM CHLORIDE: 600; 310; 30; 20 INJECTION, SOLUTION INTRAVENOUS at 12:56

## 2022-10-15 RX ADMIN — HYDROMORPHONE HYDROCHLORIDE 0.5 MG: 2 INJECTION INTRAMUSCULAR; INTRAVENOUS; SUBCUTANEOUS at 13:56

## 2022-10-15 RX ADMIN — MIDAZOLAM HYDROCHLORIDE 2 MG: 1 INJECTION, SOLUTION INTRAMUSCULAR; INTRAVENOUS at 11:16

## 2022-10-15 RX ADMIN — PREDNISONE 20 MG: 20 TABLET ORAL at 08:41

## 2022-10-15 RX ADMIN — POTASSIUM CHLORIDE, DEXTROSE MONOHYDRATE AND SODIUM CHLORIDE: 150; 5; 450 INJECTION, SOLUTION INTRAVENOUS at 01:17

## 2022-10-15 RX ADMIN — LIDOCAINE HYDROCHLORIDE 40 MG: 20 INJECTION, SOLUTION EPIDURAL; INFILTRATION; INTRACAUDAL at 11:20

## 2022-10-15 RX ADMIN — LABETALOL HYDROCHLORIDE 5 MG: 5 INJECTION, SOLUTION INTRAVENOUS at 14:13

## 2022-10-15 RX ADMIN — HYDROMORPHONE HYDROCHLORIDE 0.2 MG: 1 INJECTION, SOLUTION INTRAMUSCULAR; INTRAVENOUS; SUBCUTANEOUS at 14:50

## 2022-10-15 RX ADMIN — BUPIVACAINE HYDROCHLORIDE AND EPINEPHRINE 60 ML: 2.5; 5 INJECTION, SOLUTION EPIDURAL; INFILTRATION; INTRACAUDAL; PERINEURAL at 11:26

## 2022-10-15 RX ADMIN — SUGAMMADEX 100 MG: 100 INJECTION, SOLUTION INTRAVENOUS at 14:12

## 2022-10-15 RX ADMIN — POTASSIUM CHLORIDE, DEXTROSE MONOHYDRATE AND SODIUM CHLORIDE: 150; 5; 450 INJECTION, SOLUTION INTRAVENOUS at 19:55

## 2022-10-15 RX ADMIN — HYDROMORPHONE HYDROCHLORIDE 0.2 MG: 1 INJECTION, SOLUTION INTRAMUSCULAR; INTRAVENOUS; SUBCUTANEOUS at 15:01

## 2022-10-15 RX ADMIN — ROCURONIUM BROMIDE 30 MG: 10 INJECTION, SOLUTION INTRAVENOUS at 11:25

## 2022-10-15 RX ADMIN — SODIUM CHLORIDE, POTASSIUM CHLORIDE, SODIUM LACTATE AND CALCIUM CHLORIDE: 600; 310; 30; 20 INJECTION, SOLUTION INTRAVENOUS at 11:13

## 2022-10-15 RX ADMIN — FENTANYL CITRATE 50 MCG: 50 INJECTION, SOLUTION INTRAMUSCULAR; INTRAVENOUS at 11:49

## 2022-10-15 RX ADMIN — PROPOFOL 150 MG: 10 INJECTION, EMULSION INTRAVENOUS at 11:20

## 2022-10-15 RX ADMIN — HYDROMORPHONE HYDROCHLORIDE 0.4 MG: 1 INJECTION, SOLUTION INTRAMUSCULAR; INTRAVENOUS; SUBCUTANEOUS at 14:36

## 2022-10-15 RX ADMIN — KETAMINE HYDROCHLORIDE 50 MG: 50 INJECTION INTRAMUSCULAR; INTRAVENOUS at 12:02

## 2022-10-15 RX ADMIN — KETAMINE HYDROCHLORIDE 25 MG: 50 INJECTION INTRAMUSCULAR; INTRAVENOUS at 13:01

## 2022-10-15 RX ADMIN — ONDANSETRON 4 MG: 2 INJECTION INTRAMUSCULAR; INTRAVENOUS at 13:44

## 2022-10-15 RX ADMIN — Medication 100 MCG: at 12:17

## 2022-10-15 RX ADMIN — LIDOCAINE HYDROCHLORIDE 3 ML: 40 SOLUTION TOPICAL at 11:22

## 2022-10-15 RX ADMIN — DEXAMETHASONE SODIUM PHOSPHATE 8 MG: 4 INJECTION, SOLUTION INTRA-ARTICULAR; INTRALESIONAL; INTRAMUSCULAR; INTRAVENOUS; SOFT TISSUE at 11:45

## 2022-10-15 RX ADMIN — HYDROMORPHONE HYDROCHLORIDE 0.2 MG: 1 INJECTION, SOLUTION INTRAMUSCULAR; INTRAVENOUS; SUBCUTANEOUS at 14:56

## 2022-10-15 ASSESSMENT — PAIN DESCRIPTION - PAIN TYPE
TYPE: SURGICAL PAIN
TYPE: ACUTE PAIN;SURGICAL PAIN

## 2022-10-15 NOTE — OR NURSING
1425 Patient and handoff received from OR team. VSS. Dressings to ABD CDI, ELLEN drain and ostomy appliance in place. Anderson in place, secured with Statlock. Patient lethargic but awake. Denies pain or nausea at this time.     1430 Patient reporting 7/10 pain, will medicate patient per MAR. Denies nausea.     1500 Patient reporting 5/10 pain, will medicate per MAR. VSS. Patient brother updated on plan of care.     1515 Patient assisted with dangling legs at edge of bed. Tolerated well.     1530 Report given to Layla OLIVEIRA on T4.     1550 Handoff given to transporter. Face mask applied to patient for transport. No belongings with patient in PACU.

## 2022-10-15 NOTE — PROGRESS NOTES
10/14  Pt seen and examined, ambulating, remains NPO, still with some distension but having minimal liquid stools per rectum.  Denies any n/v at this time.  Questions about surgery but otherwise ready for OR tomorrow.

## 2022-10-15 NOTE — WOUND TEAM
Wound team consulted for new ileostomy. Sx with Dr. Samaniego was today 10/15. Plan for wound team to see patient tomorrow.

## 2022-10-15 NOTE — CARE PLAN
Department of Emergency [address] [facility]  Provider Note  Admit Date/Time: 10/24/2021 11:21 AM  Room: 07/07  MRN: [id number]  Chief Complaint: Rib Injury (Ladder fell on him as he was moving it. ) and Rib Pain (right rib pain )       History of Present Illness   Source of history provided by:  Patient. History/Exam Limitations: None. Ellen Reynolds is a 60 y.o. male with a history of ulcerative colitis, atrial fibrillation, and previous DVT/PE on Xarelto. He reports that an extension ladder fell onto him striking his right chest wall yesterday. He did not fall off the ladder. This has been persistent since. Is reproduced by palpation, range of motion, and deep breathing. Mild shortness of breath due to the pain. There was no head injury or loss of consciousness. Denies any cervical neck pain. Denies any paresthesias, weakness, or radiculopathy in the extremities. Denies any chest wall or abdominal pain. No hematuria. Denies any other injuries. ROS    Pertinent positives and negatives are stated within HPI, all other systems reviewed and are negative. Past Surgical History:   Procedure Laterality Date    COLECTOMY      ECHO COMPL W DOP COLOR FLOW  2/25/2013        Social History:  reports that he has never smoked. He has never used smokeless tobacco. He reports that he does not drink alcohol and does not use drugs. Family History: family history is not on file. Allergies: Demerol hcl [meperidine]    Physical Exam   Oxygen Saturation Interpretation: Normal.   ED Triage Vitals [10/24/21 1126]   BP Temp Temp Source Pulse Resp SpO2 Height Weight   (!) 137/98 98.1 °F (36.7 °C) Temporal 65 20 98 % 6' (1.829 m) 165 lb (74.8 kg)       Physical Exam  Physical Exam:  Gen.: Vitals noted no distress. Afebrile. Head: Normocephalic. Atraumatic. Pupils PERRL, EOMI. TMs clear without hemotympanum. Neck: Supple.  No midline or paraspinal tenderness through full range of The patient is Stable - Low risk of patient condition declining or worsening    Shift Goals  Clinical Goals: pain control, continue mobility, OR  Patient Goals: comfort, continue walking  Family Goals: no family at bedside    Progress made toward(s) clinical / shift goals: Pt bowel sounds more active than yesterday. Pt OOB and walks halls, pain is well controlled. Plan for OR today.    Patient is not progressing towards the following goals: N/A       motion. No step-off or crepitance. Cardiac: Regular rate rhythm no murmur. Lungs: Clear to auscultation bilaterally with good aeration and no adventitious breath sounds. There is reproducible tenderness in the right anterolateral chest wall 6 through ninth rib region. No paradoxical movement. No crepitance, ecchymosis, or subcutaneous emphysema. The right upper quadrant is nontender to palpation. Abdomen: Soft, nontender, nonsurgical.  Again, specifically nontender in the right upper quadrant. Normoactive bowel sounds. Back: No midline or paraspinal tenderness throughout. No step-off or crepitance. Extremities: No edema. Skin: No rash. Neuro: No focal neurologic deficits. GCS is 15. Lab / Imaging Results   (All laboratory and radiology results have been personally reviewed by myself)  Labs:  No results found for this visit on 10/24/21. Imaging: All Radiology results interpreted by Radiologist unless otherwise noted. XR RIBS RIGHT INCLUDE CHEST (MIN 3 VIEWS)   Final Result   Nondisplaced fracture of the right 7th rib laterally. There is no pneumothorax or lung contusion. ED Course / Medical Decision Making   Medications - No data to display       Consult(s):   None    Procedure(s):   None    Differential Diagnosis: Is extensive but includes chest wall contusion, rib fracture, pneumothorax/hemothorax, spinal/vertebral fracture, intra-abdominal injury such as liver or renal laceration, viscous injury, etc.    MDM:   This is a 60 y.o. male who presents after having an extension ladder fall onto him striking his right chest wall yesterday. On exam, there is reproducible tenderness in the right anterolateral chest wall but has clear and equal breath sounds. The abdomen is nontender. Is not hypoxemic.  Rib series was obtained which shows a nondisplaced fracture of the right seventh rib without underlying pneumothorax nor pulmonary contusion per the radiologist.  Will be home-going with a few Norco as NSAIDs are contraindicated due to his anticoagulation. Unfortunately, do not have an incentive spirometer to provide to the patient here at the urgent care however we discussed deep breathing. Counseling: I discussed the differential, results and discharge plan with the patient and/or family/friend/caregiver if present. I emphasized the importance of follow-up with the physician I referred them to in the timeframe recommended. I explained reasons for the patient to return to the Emergency Department. Additional verbal discharge instructions were also given and discussed with the patient to supplement those generated by the EMR. We also discussed medications that were prescribed (if any) including common side effects and interactions. The patient was advised to abstain from driving, operating heavy machinery or making significant decisions while taking medications such as opiates and muscle relaxers that may impair this. All questions were addressed. They understand return precautions and discharge instructions. The patient and/or family/friend/caregiver expressed understanding. Assessment      1. Closed fracture of one rib of right side, initial encounter      Plan   Discharge to home and advised to contact [facility], [address] [place] [address]  [phone]    In 2 days     Patient condition is good    New Medications     New Prescriptions    HYDROCODONE-ACETAMINOPHEN (NORCO) 5-325 MG PER TABLET    Take 1 tablet by mouth every 4 hours as needed for Pain for up to 3 days. Intended supply: 3 days. Take lowest dose possible to manage pain     Electronically signed by MARCOS Yap   DD: 10/24/21  **This report was transcribed using voice recognition software. Every effort was made to ensure accuracy; however, inadvertent computerized transcription errors may be present.   END OF ED PROVIDER NOTE          [facility] [address] [place] [address]  10/24/21 [address] [place] [address] [place]  10/24/21 1210

## 2022-10-15 NOTE — PROGRESS NOTES
4 Eyes Skin Assessment Completed by Layla and Bernardo OLIVEIRA.     Head WDL  Ears WDL  Nose WDL  Mouth WDL  Neck WDL  Breast/Chest WDL  Shoulder Blades WDL  Spine WDL  (R) Arm/Elbow/Hand WDL  (L) Arm/Elbow/Hand WDL  Abdomen midline incision w/ island dressing (small amount of shadowing noted and marked), RLQ Ileostomy, RLQ ELLEN drain  Groin Anderson in place  Scrotum/Coccyx/Buttocks WDL  (R) Leg WDL  (L) Leg WDL  (R) Heel/Foot/Toe dry  (L) Heel/Foot/Toe dry              Devices In Place oxymask, BP cuff, , Anderson, Ileostomy, ELLEN drain        Interventions In Place SCDs, pillows     Possible Skin Injury No     Pictures Uploaded Into Epic No  Wound Consult Placed No   RN Wound Prevention Protocol Ordered No    Layla Carias R.N.

## 2022-10-15 NOTE — PROGRESS NOTES
Pt is A&O 4  VSS  Pain declines   declines nausea  NPO  + Voids  + flatus  + BM  Up self  SCD's off  Family  Bed alarm off, pt no fall risk per zachary snow  Reviewed plan of care with patient, bed in lowest position and locked, pt resting comfortably now, call light within reach, all needs met at this time. Interventions will be executed per plan of care

## 2022-10-15 NOTE — ANESTHESIA PROCEDURE NOTES
Peripheral Block    Date/Time: 10/15/2022 11:26 AM  Performed by: Shanae Cameron M.D.  Authorized by: Shanae Cameron M.D.     Patient Location:  OR  Start Time:  10/15/2022 11:26 AM  End Time:  10/15/2022 11:34 AM  Reason for Block: at surgeon's request and post-op pain management ONLY    patient identified, IV checked, site marked, risks and benefits discussed, surgical consent, monitors and equipment checked, pre-op evaluation and timeout performed    Patient Position:  Supine  Prep: ChloraPrep    Monitoring:  Heart rate, continuous pulse ox and cardiac monitor  Block Region:  Trunk  Trunk - Block Type:  Abdominal plane block - TAP block    Laterality:  Bilateral  Procedures: ultrasound guided  Image captured, interpreted and electronically stored.  Strength:  1 %  Dose:  3 ml  Block Type:  Single-shot  Needle Length:  100mm  Needle Gauge:  21 G  Needle Localization:  Ultrasound guidance  Injection Assessment:  Negative aspiration for heme, no paresthesia on injection, incremental injection and local visualized surrounding nerve on ultrasound   Performed under GA

## 2022-10-15 NOTE — CARE PLAN
Shift Goals  Clinical Goals: monitor for pain, provide comfort measures  Patient Goals: comfort, walk halls  Family Goals: no family at bedside    Progress made toward(s) clinical / shift goals:       Problem: Fall Risk  Goal: Patient will remain free from falls  Outcome: Progressing     Problem: Knowledge Deficit - Standard  Goal: Patient and family/care givers will demonstrate understanding of plan of care, disease process/condition, diagnostic tests and medications  Outcome: Progressing

## 2022-10-15 NOTE — ANESTHESIA TIME REPORT
Anesthesia Start and Stop Event Times     Date Time Event    10/15/2022 1058 Ready for Procedure     1113 Anesthesia Start     1426 Anesthesia Stop        Responsible Staff  10/15/22    Name Role Begin End    Shanae Cameron M.D. Anesth 1113 1426        Overtime Reason:  no overtime (within assigned shift)    Comments:

## 2022-10-15 NOTE — PROGRESS NOTES
Assumed care of patient at 0645. Bedside report received. Assessment complete.    AA&Ox4. Room air.    Reporting 0/10 pain. Declined pharmacologic intervention at this time.   Educated patient regarding pharmacologic and non pharmacologic modalities for pain management.    Skin per flow sheets.    NPO, OR today.IVF running.    + void. Last BM PTA.    Pt ambulates x self.    Plan of care discussed, all questions answered. Educated on the importance of calling before getting OOB and pt verbalizes understanding. Educated regarding importance of oral care. Oral care kit at bedside. Call light is within reach, treaded slipper socks on, bed in lowest/ locked position, hourly rounding in place, all needs met at this time.    Layla Carias R.N.

## 2022-10-15 NOTE — ANESTHESIA POSTPROCEDURE EVALUATION
Patient: Zain Townsend    Procedure Summary     Date: 10/15/22 Room / Location: [place] [address] / SURGERY [facility]    Anesthesia Start: 1113 Anesthesia Stop:     Procedures:       ROBOTIC VERSUS OPEN PROCTOOCOLECTOMY      PROCTECTOMY,      COLECTOMY Diagnosis:     Surgeons: Willy Samaniego M.D. Responsible Provider: Shanae Cameron M.D.    Anesthesia Type: general, peripheral nerve block ASA Status: 2          Final Anesthesia Type: general, peripheral nerve block  Last vitals  BP   Blood Pressure : (!) 153/83    Temp   36.8 °C (98.2 °F)    Pulse   62   Resp   20    SpO2   97 %      Anesthesia Post Evaluation    Patient location during evaluation: PACU  Patient participation: complete - patient participated  Level of consciousness: awake and alert    Airway patency: patent  Anesthetic complications: no  Cardiovascular status: hemodynamically stable  Respiratory status: acceptable  Hydration status: euvolemic    PONV: none    patient able to participate, but full recovery from regional anesthesia has not occurred and is not expected within the stipulated timeframe for the completion of the evaluation      No notable events documented.     Nurse Pain Score: 5 (NPRS)

## 2022-10-15 NOTE — ANESTHESIA PROCEDURE NOTES
Airway    Date/Time: 10/15/2022 11:22 AM  Performed by: Shanae Cameron M.D.  Authorized by: Shanae Cameron M.D.     Location:  OR  Urgency:  Elective  Indications for Airway Management:  Anesthesia      Spontaneous Ventilation: absent    Sedation Level:  Deep  Preoxygenated: Yes    Patient Position:  Sniffing  Mask Difficulty Assessment:  0 - not attempted  Final Airway Type:  Endotracheal airway  Final Endotracheal Airway:  ETT  Cuffed: Yes    Technique Used for Successful ETT Placement:  Direct laryngoscopy  Devices/Methods Used in Placement:  Intubating stylet and cricoid pressure    Insertion Site:  Oral  Blade Type:  Ella  Laryngoscope Blade/Videolaryngoscope Blade Size:  3  ETT Size (mm):  7.5  Measured from:  Teeth  ETT to Teeth (cm):  22  Placement Verified by: auscultation and capnometry    Cormack-Lehane Classification:  Grade IIb - view of arytenoids or posterior of glottis only  Number of Attempts at Approach:  1

## 2022-10-15 NOTE — ANESTHESIA PREPROCEDURE EVALUATION
Case: [id number] Date/Time: 10/15/22 1045    Procedures:       ROBOTIC VERSUS OPEN PROCTOOCOLECTOMY      PROCTECTOMY,      COLECTOMY    Location: [facility] OR  / SURGERY [facility]    Surgeons: Willy Samaniego M.D.        66 yo M with history of UC with large bowel obstruction here for robotic, possible open proctocolectomy. Denies problems with anesthesia in the past.  No current CP/SOB/N/V symptoms.    NPO  Relevant Problems   Other   (positive) Ulcerative colitis with complication (HCC)       Physical Exam    Airway   Mallampati: II  TM distance: <3 FB  Neck ROM: full       Cardiovascular - normal exam  Rhythm: regular  Rate: normal  (-) murmur     Dental - normal exam        Facial Hair   Pulmonary - normal exam  Breath sounds clear to auscultation     Abdominal    Neurological - normal exam                 Anesthesia Plan    ASA 2       Plan - general and peripheral nerve block     Peripheral nerve block will be post-op pain control  Airway plan will be ETT    (Glidescope available)      Induction: intravenous and rapid sequence    Postoperative Plan: Postoperative administration of opioids is intended.    Pertinent diagnostic labs and testing reviewed    Informed Consent:    Anesthetic plan and risks discussed with patient.    Use of blood products discussed with: patient whom consented to blood products.

## 2022-10-16 LAB
ANION GAP SERPL CALC-SCNC: 10 MMOL/L (ref 7–16)
BUN SERPL-MCNC: 11 MG/DL (ref 8–22)
CALCIUM SERPL-MCNC: 7 MG/DL (ref 8.5–10.5)
CHLORIDE SERPL-SCNC: 100 MMOL/L (ref 96–112)
CO2 SERPL-SCNC: 21 MMOL/L (ref 20–33)
CREAT SERPL-MCNC: 0.67 MG/DL (ref 0.5–1.4)
ERYTHROCYTE [DISTWIDTH] IN BLOOD BY AUTOMATED COUNT: 45.2 FL (ref 35.9–50)
GFR SERPLBLD CREATININE-BSD FMLA CKD-EPI: 101 ML/MIN/1.73 M 2
GLUCOSE SERPL-MCNC: 161 MG/DL (ref 65–99)
HCT VFR BLD AUTO: 38.4 % (ref 42–52)
HGB BLD-MCNC: 12.9 G/DL (ref 14–18)
MAGNESIUM SERPL-MCNC: 2.2 MG/DL (ref 1.5–2.5)
MCH RBC QN AUTO: 31.4 PG (ref 27–33)
MCHC RBC AUTO-ENTMCNC: 33.6 G/DL (ref 33.7–35.3)
MCV RBC AUTO: 93.4 FL (ref 81.4–97.8)
PLATELET # BLD AUTO: 295 K/UL (ref 164–446)
PMV BLD AUTO: 10.4 FL (ref 9–12.9)
POTASSIUM SERPL-SCNC: 4.5 MMOL/L (ref 3.6–5.5)
RBC # BLD AUTO: 4.11 M/UL (ref 4.7–6.1)
SODIUM SERPL-SCNC: 131 MMOL/L (ref 135–145)
WBC # BLD AUTO: 8.7 K/UL (ref 4.8–10.8)

## 2022-10-16 PROCEDURE — 83735 ASSAY OF MAGNESIUM: CPT

## 2022-10-16 PROCEDURE — A9270 NON-COVERED ITEM OR SERVICE: HCPCS | Performed by: SURGERY

## 2022-10-16 PROCEDURE — 302111 WAFER OST 2.25IN N IMG RD 2 PC (BARRIER): Performed by: SURGERY

## 2022-10-16 PROCEDURE — 36415 COLL VENOUS BLD VENIPUNCTURE: CPT

## 2022-10-16 PROCEDURE — 700101 HCHG RX REV CODE 250: Performed by: SURGERY

## 2022-10-16 PROCEDURE — 302102 BAG OST N IMG 2.25IN 2PC (FECAL): Performed by: SURGERY

## 2022-10-16 PROCEDURE — 770001 HCHG ROOM/CARE - MED/SURG/GYN PRIV*

## 2022-10-16 PROCEDURE — 85027 COMPLETE CBC AUTOMATED: CPT

## 2022-10-16 PROCEDURE — 700102 HCHG RX REV CODE 250 W/ 637 OVERRIDE(OP): Performed by: SURGERY

## 2022-10-16 PROCEDURE — 302098 PASTE RING (FLAT): Performed by: SURGERY

## 2022-10-16 PROCEDURE — 700111 HCHG RX REV CODE 636 W/ 250 OVERRIDE (IP): Performed by: SURGERY

## 2022-10-16 PROCEDURE — 80048 BASIC METABOLIC PNL TOTAL CA: CPT

## 2022-10-16 RX ADMIN — ACETAMINOPHEN 1000 MG: 500 TABLET ORAL at 00:01

## 2022-10-16 RX ADMIN — KETOROLAC TROMETHAMINE 15 MG: 30 INJECTION, SOLUTION INTRAMUSCULAR at 12:56

## 2022-10-16 RX ADMIN — ACETAMINOPHEN 1000 MG: 500 TABLET ORAL at 12:55

## 2022-10-16 RX ADMIN — POTASSIUM CHLORIDE, DEXTROSE MONOHYDRATE AND SODIUM CHLORIDE: 150; 5; 450 INJECTION, SOLUTION INTRAVENOUS at 10:08

## 2022-10-16 RX ADMIN — PREDNISONE 20 MG: 20 TABLET ORAL at 05:22

## 2022-10-16 RX ADMIN — ENOXAPARIN SODIUM 40 MG: 40 INJECTION SUBCUTANEOUS at 18:44

## 2022-10-16 RX ADMIN — KETOROLAC TROMETHAMINE 15 MG: 30 INJECTION, SOLUTION INTRAMUSCULAR at 18:44

## 2022-10-16 RX ADMIN — POTASSIUM CHLORIDE, DEXTROSE MONOHYDRATE AND SODIUM CHLORIDE: 150; 5; 450 INJECTION, SOLUTION INTRAVENOUS at 22:02

## 2022-10-16 RX ADMIN — KETOROLAC TROMETHAMINE 15 MG: 30 INJECTION, SOLUTION INTRAMUSCULAR at 05:22

## 2022-10-16 RX ADMIN — ACETAMINOPHEN 1000 MG: 500 TABLET ORAL at 05:21

## 2022-10-16 RX ADMIN — KETOROLAC TROMETHAMINE 15 MG: 30 INJECTION, SOLUTION INTRAMUSCULAR at 00:01

## 2022-10-16 RX ADMIN — ACETAMINOPHEN 1000 MG: 500 TABLET ORAL at 18:44

## 2022-10-16 ASSESSMENT — PAIN DESCRIPTION - PAIN TYPE
TYPE: ACUTE PAIN;SURGICAL PAIN
TYPE: ACUTE PAIN;SURGICAL PAIN

## 2022-10-16 NOTE — PROGRESS NOTES
"10/16:  S:  67 y.o.male s/p Open Total Proctocolectomy with End Ileostomy  POD# 1.  Patient doing well overnight, pain controlled, tolerating clears without any n/v, not yet ambulating post-op    O:  /75   Pulse 68   Temp 36.2 °C (97.1 °F) (Temporal)   Resp 16   Ht 1.727 m (5' 8\")   Wt 64 kg (141 lb 1.5 oz)   SpO2 93%   I/O last 3 completed shifts:  In: 2280.5 [P.O.:480; I.V.:1800.5]  Out: 1575 [Urine:850; Drains:375]  Recent Labs     10/16/22  0522   SODIUM 131*   POTASSIUM 4.5   CHLORIDE 100   CO2 21   GLUCOSE 161*   BUN 11   CREATININE 0.67   CALCIUM 7.0*     Recent Labs     10/16/22  0522   WBC 8.7   RBC 4.11*   HEMOGLOBIN 12.9*   HEMATOCRIT 38.4*   MCV 93.4   MCH 31.4   MCHC 33.6*   RDW 45.2   PLATELETCT 295   MPV 10.4       Alert and Oriented x3, No Acute Distress  Normal Respiratory Effort  Abdomen soft, appropriately tender  Incisions/Bandages clean/dry/intact  Extremities warm and well perfused  Ostomy pink and healthy, output scant liquid only  ELLEN Output Serosanguinous-375cc    A/P:  Pain control with PCA  Diet Clears only PO for now  Fluids continue maintence  Ambulate tid and ad chrissie  Ostomy care and teaching  OT consult- rehab need?    "

## 2022-10-16 NOTE — CARE PLAN
The patient is Stable - Low risk of patient condition declining or worsening    Shift Goals  Clinical Goals: Monitor drains, pulmonary hygiene  Patient Goals: Pain control, rest  Family Goals: no family at bedside    Progress made toward(s) clinical / shift goals:  Drains monitored and emptied. IS use in place, patient able to reach 2000 and no supplemental O2 needed    Patient is not progressing towards the following goals:

## 2022-10-16 NOTE — OP REPORT
DATE OF SERVICE:  10/15/2022     PREOPERATIVE DIAGNOSIS:  Ulcerative colitis with large bowel obstruction.     POSTOPERATIVE DIAGNOSIS:  Ulcerative colitis with large bowel obstruction.     PROCEDURES:  1.  Open total proctocolectomy with end ileostomy placement.  2.  Diagnostic laparoscopy.     SURGEON:  Willy Samaniego MD     ASSISTANT:  None.     ANESTHESIA:  General endotracheal anesthesia.     ESTIMATED BLOOD LOSS:  250 mL.     SPECIMENS:  Large intestine.     COMPLICATIONS:  None.     CONDITION:  Stable.     INDICATIONS FOR PROCEDURE:  This is a 67-year-old male who presents with a   complicated ulcerative colitis with severe strictures in the rectum causing   pseudo large bowel obstruction.  Despite several days of n.p.o. status, he is   still distended and is refractory to medical management and needs definitive   surgery at this time.  We discussed the risks, benefits and alternatives of a   total versus subtotal proctectomy.  The patient elects for a total proctectomy   at this time with placement of an end ileostomy.  Risks, benefits and   alternatives of the proposed surgery were explained to the patient before   proceeding.     OPERATIVE FINDINGS:  Distended large and small intestine precluding a   minimally invasive approach.  Open total proctocolectomy completed with   hemostasis.  Staple line placed at the pelvic floor.  End ileostomy placed in   the right lower quadrant, 19-Belarusian round drain in the pelvis.     OPERATIVE TECHNIQUE:  After informed consent was obtained, the patient was   taken to the operating room and placed in supine position.  After adequate   endotracheal anesthesia was achieved, the patient was switched to lithotomy   position.  The abdomen was prepped and draped in sterile fashion.  Operation   was begun by placing a 5 mm periumbilical incision through which 5 mm trocar   was introduced into the abdomen using Optiview technique.  After   pneumoperitoneum was achieved,  additional 8 mm trocar was placed in the right   lower quadrant.  Both trocars were placed with 0.5% Marcaine with epinephrine   for local anesthesia.  We conducted diagnosis laparoscopy and dried patient   positioning, but due to the dilated distended state of both the colon and   small intestine, we could not see any feasible way of completing the   proctectomy robotically at this time.  We therefore elected to switch over to   an open approach.     A large midline laparotomy incision, was placed and carried down with   electrocautery through the fascia.  The peritoneum was opened widely and a   Bookwalter retractor was secured into position.  An orogastric tube was placed   by anesthesia and the small bowel milked back up into the stomach, which   decompressed some of the distention.  We were then able to visualize the   important structures.  We began by mobilizing the cecum and right colon off of   the lateral abdominal wall using cautery.  We  the attachments of   the terminal ileum and cecum from the retroperitoneum.  We identified and   preserved the gonadal vessels and ureter in the process.  We continued   mobilizing the right colon reflecting the duodenum posteriorly.  We then   isolated and divided the ileocolic pedicle through the mesentery.  We then   divided the terminal ileum through a mesenteric window with a blue load DOYLE-75   staple fire.  Vessel sealer was used to finish dividing the small bowel   mesentery up to this point as well.  We then continued taking down the hepatic   flexure attachments and omentum from the proximal transverse colon.  We then   ligated the branches of the middle colic vessels as well with hemostasis.     Next, we continued our dissection down the transverse colon,  the   omentum up to the splenic flexure.  We opened the lesser sac and began   removing splenocolic ligaments as well.  We then opened the retroperitoneum   along the descending colon and  dissected into the retroperitoneal space.  We   carried this up to the splenic flexure and completed our mobilization with a   LigaSure device.  We then ligated the IMV close to its origin as well as   remaining retroperitoneal attachments of the descending and transverse colon   completing the flexure mobilization.     Now, we turned our attention toward the pelvis and repositioned the patient   accordingly.  We took down the left lateral attachments of the sigmoid colon   and dissected in the retroperitoneum, we identified the left gonadal vessels   and ureter, which were preserved throughout the procedure.  We then opened the   retroperitoneum on the right side and connected the two dissection planes and   isolated the GIANNA pedicle, which was ligated with the vessel sealer device   with resulting hemostasis.  We then carried our dissection down toward the   pelvic floor following the presacral bloodless plane posteriorly.  Right and   left lateral attachments were taken down with the vessel sealer device.  We   then opened the cul-de-sac anteriorly and using a St. Zain's retractor,   dissected down below Denonvilliers fascia.  We continued our circumferential   dissection of the rectum all the way down to the pelvic floor, which was   confirmed with a digital rectal exam.  Once we were left with only a very   small cuff of the proximal anal canal remaining, we fired a green load contour   stapler across the rectum and removed the large intestine specimen from the   operative field.     We now irrigated the abdomen with 2 liters of warm saline and checked lap   counts, which were correct.  We then placed a 19-Turkmen round drain through   one of the laparoscopic port sites, which was coiled in the pelvis.  This was   sewn to the skin with a 2-0 nylon.  We then made a circular incision in the   right lower quadrant, removed a core of fatty tissue down to the abdominal   wall.  Fascia was cut, muscle spread apart and  the end of the small bowel   reduced onto the operative field.  We then placed Seprafilm within the abdomen   proper and switched over to a closing Dominguez stand.     Now using clean gloves and instruments, we closed the abdominal wall fascia   with 2 running 0 PDS sutures tied in the midline.  Internal retention sutures   were placed with #2 Vicryl as well.  We closed the deep tissues with 3-0   Vicryl and the skin with staples.  We then opened the end of the small bowel   and matured the ileostomy using 3-0 Vicryl sutures with a Ernestine technique.    Ostomy appliance and dry sterile dressing was replaced and the procedure was   concluded.  The patient was taken to the recovery room in stable condition.    All instrument counts were correct at the end of the procedure.        ______________________________  MD DIANA Peter/LIZZIE/VEE    DD:  10/15/2022 14:30  DT:  10/15/2022 17:03    Job#:  [id number]

## 2022-10-16 NOTE — WOUND TEAM
" [facility]  Inpatient Services  New Ostomy Management & Teaching    HPI:  Reviewed  PMH: Reviewed   SH: Reviewed    Subjective: \"OK, your the wound nurse?\" \"How long does it take people to get used to this?\"    Objective: in bed, PCA in place, planning to work with PT today.  Clear liquid diet.                  Ileostomy 10/15/22 Standard (Ernestine, end) (Active)   Stomal Appliance Assessment Leaking;Changed    Stoma Assessment Red;Edema;Pink    Stoma Shape Budded Less Than One Inch;Oval    Stoma Size (in) 1.25    Peristomal Assessment Intact    Mucocutaneous Junction Intact    Treatment Appliance Changed;Cleansed with water/washcloth    Peristomal Protectant Paste Ring    Stomal Appliance Paste Ring, 2\";2 1/4\" (57mm) CTF    Output Color Brown    WOUND RN ONLY - Stomal Appliance  2 Piece;Paste Ring, 2\";2 1/4\" (57mm) CTF    Appliance Brand Shon    Appliance Supplier [employer]    Secure Start completed Yes    Ostomy Care Resources Provided [facility] Tip Sheet    WOUND NURSE ONLY - Time Spent with Patient (mins) 90                     Ostomy Appliance (type and size): 2.25\" 2 piece and 2\" Paste Ring    Interventions: went over paperwork and answered many questions.  Patient observed all steps and verbalized understanding.  Removed previous appliance and island dressing (as was saturated). Cleansed skin with warm wash cloth.  Measured stoma with cardboard template.  Traced and cut barrier, applied paste ring to barrier and applied to skin.  Attached and closed pouch.  Patient practiced closing end.       Pt education: Questions and concerns addressed    Evaluation: patient has many questions and is eager to learn.  Likely not leaving to Friday.      Flatus: Present  Stool Output: small and brown  Diet: Clears  Mobility: Not Mobilizing    Plan: Ostomy nurses to continue to follow for ostomy needs and teaching until discharge    Anticipated discharge needs: Supplies, supplier information, possible HH, " outpatient ostomy clinic, Skilled Nursing/Rehab     Secure Start Signed Yes  Outpatient Referral Placed Declined  5 Sets of appliances in Ostomy bag for discharge No    INSURANCE OPTIONS:                 [facility] ([employer])        X      MediCARE/MEDICAID & All other Private Insurance companies ([employer] Form)              MediCAID & Fee for Service (Care Chest Paperwork + [employer] Form)                            Form signed/Catalog Marked and Copy left with patient OR medicaid paperwork given to patient      Anticipated Discharge Plans:  Self Care

## 2022-10-16 NOTE — PROGRESS NOTES
Received report of patient at start of shift. Patient is AOx4, Dilaudid PCA in use for pain control. Assessment complete, patient on room air. Anderson catheter in place with miranda colored output. RLQ ostomy with watery brown output. ELLEN drain to RLQ. Patient eager to mobilize. Assisted patient to edge of bed, patient reporting new onset bilateral leg numbness. Patient unable to stand, sat at edge of bed approximately 30 minutes. Voalte message sent to Dr. Samaniego with update. Patient updated on plan of care, encouraged to use call light for any needs/assistance. Safety education provided.

## 2022-10-16 NOTE — CARE PLAN
The patient is Stable - Low risk of patient condition declining or worsening    Shift Goals  Clinical Goals: Increased mobility, pain control  Patient Goals: Increased mobility, pain control  Family Goals: no family at bedside    Progress made toward(s) clinical / shift goals:  Patient reports adequate pain control with the use of Dilaudid PCA and scheduled medications. Patient sat at edge of bed for approximately 30 minutes this shift.

## 2022-10-16 NOTE — PROGRESS NOTES
Report received from dayshift RN, assumed care at 1900.  Assessment complete.  A&O x 4. Patient calls appropriately.  Patient ambulates with standby assist.   Patient has 4/10 pain. Pain managed with dilaudid PCA  Denies N&V. Tolerating clear liquid diet.  Surgical MLI with island dressing. R ELLEN, RLQ ileostomy.  + void via allen catheter, + flatus, + BM via ostomy.  Patient denies SOB. 10L oxymask for ERAS until 2230  SCD's on.  Patient calm and cooperative.  Review plan with of care with patient. Call light and personal belongings within reach. Hourly rounding in place. All needs met at this time.

## 2022-10-17 LAB
ANION GAP SERPL CALC-SCNC: 10 MMOL/L (ref 7–16)
BUN SERPL-MCNC: 11 MG/DL (ref 8–22)
CALCIUM SERPL-MCNC: 7.1 MG/DL (ref 8.5–10.5)
CHLORIDE SERPL-SCNC: 100 MMOL/L (ref 96–112)
CO2 SERPL-SCNC: 21 MMOL/L (ref 20–33)
CREAT SERPL-MCNC: 0.72 MG/DL (ref 0.5–1.4)
ERYTHROCYTE [DISTWIDTH] IN BLOOD BY AUTOMATED COUNT: 46.4 FL (ref 35.9–50)
GFR SERPLBLD CREATININE-BSD FMLA CKD-EPI: 99 ML/MIN/1.73 M 2
GLUCOSE SERPL-MCNC: 102 MG/DL (ref 65–99)
HCT VFR BLD AUTO: 34 % (ref 42–52)
HGB BLD-MCNC: 11.5 G/DL (ref 14–18)
MCH RBC QN AUTO: 31.8 PG (ref 27–33)
MCHC RBC AUTO-ENTMCNC: 33.8 G/DL (ref 33.7–35.3)
MCV RBC AUTO: 93.9 FL (ref 81.4–97.8)
PLATELET # BLD AUTO: 262 K/UL (ref 164–446)
PMV BLD AUTO: 10.3 FL (ref 9–12.9)
POTASSIUM SERPL-SCNC: 4.4 MMOL/L (ref 3.6–5.5)
RBC # BLD AUTO: 3.62 M/UL (ref 4.7–6.1)
SODIUM SERPL-SCNC: 131 MMOL/L (ref 135–145)
WBC # BLD AUTO: 11 K/UL (ref 4.8–10.8)

## 2022-10-17 PROCEDURE — 97165 OT EVAL LOW COMPLEX 30 MIN: CPT

## 2022-10-17 PROCEDURE — 700101 HCHG RX REV CODE 250: Performed by: SURGERY

## 2022-10-17 PROCEDURE — 85027 COMPLETE CBC AUTOMATED: CPT

## 2022-10-17 PROCEDURE — A9270 NON-COVERED ITEM OR SERVICE: HCPCS | Performed by: SURGERY

## 2022-10-17 PROCEDURE — 80048 BASIC METABOLIC PNL TOTAL CA: CPT

## 2022-10-17 PROCEDURE — 700102 HCHG RX REV CODE 250 W/ 637 OVERRIDE(OP): Performed by: SURGERY

## 2022-10-17 PROCEDURE — 700111 HCHG RX REV CODE 636 W/ 250 OVERRIDE (IP): Performed by: SURGERY

## 2022-10-17 PROCEDURE — 770001 HCHG ROOM/CARE - MED/SURG/GYN PRIV*

## 2022-10-17 PROCEDURE — 36415 COLL VENOUS BLD VENIPUNCTURE: CPT

## 2022-10-17 RX ADMIN — KETOROLAC TROMETHAMINE 15 MG: 30 INJECTION, SOLUTION INTRAMUSCULAR at 23:22

## 2022-10-17 RX ADMIN — ACETAMINOPHEN 1000 MG: 500 TABLET ORAL at 05:50

## 2022-10-17 RX ADMIN — ENOXAPARIN SODIUM 40 MG: 40 INJECTION SUBCUTANEOUS at 17:07

## 2022-10-17 RX ADMIN — POTASSIUM CHLORIDE, DEXTROSE MONOHYDRATE AND SODIUM CHLORIDE: 150; 5; 450 INJECTION, SOLUTION INTRAVENOUS at 23:23

## 2022-10-17 RX ADMIN — ACETAMINOPHEN 1000 MG: 500 TABLET ORAL at 23:22

## 2022-10-17 RX ADMIN — ACETAMINOPHEN 1000 MG: 500 TABLET ORAL at 12:39

## 2022-10-17 RX ADMIN — ACETAMINOPHEN 1000 MG: 500 TABLET ORAL at 17:08

## 2022-10-17 RX ADMIN — ONDANSETRON 4 MG: 2 INJECTION INTRAMUSCULAR; INTRAVENOUS at 00:18

## 2022-10-17 RX ADMIN — KETOROLAC TROMETHAMINE 15 MG: 30 INJECTION, SOLUTION INTRAMUSCULAR at 12:39

## 2022-10-17 RX ADMIN — KETOROLAC TROMETHAMINE 15 MG: 30 INJECTION, SOLUTION INTRAMUSCULAR at 00:18

## 2022-10-17 RX ADMIN — KETOROLAC TROMETHAMINE 15 MG: 30 INJECTION, SOLUTION INTRAMUSCULAR at 17:08

## 2022-10-17 RX ADMIN — KETOROLAC TROMETHAMINE 15 MG: 30 INJECTION, SOLUTION INTRAMUSCULAR at 05:49

## 2022-10-17 RX ADMIN — POTASSIUM CHLORIDE, DEXTROSE MONOHYDRATE AND SODIUM CHLORIDE: 150; 5; 450 INJECTION, SOLUTION INTRAVENOUS at 10:44

## 2022-10-17 RX ADMIN — PREDNISONE 20 MG: 20 TABLET ORAL at 05:50

## 2022-10-17 ASSESSMENT — PAIN DESCRIPTION - PAIN TYPE
TYPE: ACUTE PAIN;SURGICAL PAIN
TYPE: ACUTE PAIN
TYPE: ACUTE PAIN;SURGICAL PAIN
TYPE: ACUTE PAIN
TYPE: ACUTE PAIN;SURGICAL PAIN
TYPE: ACUTE PAIN;SURGICAL PAIN

## 2022-10-17 ASSESSMENT — COGNITIVE AND FUNCTIONAL STATUS - GENERAL
DRESSING REGULAR LOWER BODY CLOTHING: A LITTLE
PERSONAL GROOMING: A LITTLE
TOILETING: A LITTLE
DRESSING REGULAR UPPER BODY CLOTHING: A LITTLE
SUGGESTED CMS G CODE MODIFIER DAILY ACTIVITY: CK
HELP NEEDED FOR BATHING: A LITTLE
DAILY ACTIVITIY SCORE: 19

## 2022-10-17 ASSESSMENT — ACTIVITIES OF DAILY LIVING (ADL): TOILETING: INDEPENDENT

## 2022-10-17 NOTE — THERAPY
"Occupational Therapy   Initial Evaluation     Patient Name: Zain Townsend  Age:  67 y.o., Sex:  male  Medical Record #: [id number]  Today's Date: 10/17/2022     Precautions  Precautions: Fall Risk (abdominal precautions)  Comments: ELLEN drain    Assessment    Patient is 67 y.o. male s/p open total proctocolectomy with end ileostomy POD#2. Other pertinent medical hx includes ulcerative colitis. Pt seen for OT evaluation. Pt donned/doffed socks w/ SBA. Pt required min A to stand EOB and had one minor LOB upon initially standing w/ self recovery. Pt hands noted to have ulnar deviation of digits that pt reported is due to arthritis. Pt able to side step EOB to move up in bed. Pt reported being I prior to admission and living alone w/ his cat. Pt has neighbors who help him occasionally, usually by assisting in care of his cat when he is away. Pt demonstrates impaired balance, strength, and activity tolerance that are limiting current functional performance. Pt will benefit from skilled OT while admitted to acute care.     Plan    Recommend Occupational Therapy 3 times per week until therapy goals are met for the following treatments:  Adaptive Equipment, Self Care/Activities of Daily Living, Therapeutic Activities, and Therapeutic Exercises.    DC Equipment Recommendations: Unable to determine at this time  Discharge Recommendations: Recommend post-acute placement for additional occupational therapy services prior to discharge home (likely to progress to home w/ HH)     Subjective    \"I am so happy you came! I want to be more independent and I feel so good that I was able to do something today\"     Objective     10/17/22 1002   Prior Living Situation   Prior Services None   Housing / Facility 2 Story House   Steps Into Home   (1-2 steps and a ramp)   Steps In Home   (one flight of stairs)   Bathroom Set up Walk In Shower;Bathtub / Shower Combination;Shower Chair   Equipment Owned Tub / Shower Seat;Wheelchair;Raised " Toilet Seat With Arms   Lives with - Patient's Self Care Capacity Alone and Able to Care For Self   Comments Pt reported that he lives alone. Pt has neighbors who look after his cat when he is not home.   Prior Level of ADL Function   Self Feeding Independent   Grooming / Hygiene Independent   Bathing Independent   Dressing Independent   Toileting Independent   Prior Level of IADL Function   Medication Management Independent   Laundry Independent   Kitchen Mobility Independent   Finances Independent   Home Management Independent   Shopping Independent   Prior Level Of Mobility Independent Without Device in Community;Independent Without Device in Home   Driving / Transportation Driving Independent   Leisure Interests Pets  (cat)   Non Verbal Descriptors   Non Verbal Scale  Calm   Cognition    Cognition / Consciousness WDL   Level of Consciousness Alert   Comments Pleasant and cooperative, hyperverbose   Passive ROM Upper Body   Passive ROM Upper Body WDL   Comments ulnar deviation of digits, reported hx of arthritis   Active ROM Upper Body   Active ROM Upper Body  WDL   Strength Upper Body   Upper Body Strength  X   Gross Strength Generalized Weakness, Equal Bilaterally.    Comments 4/5 grossly   Sensation Upper Body   Upper Extremity Sensation  WDL   Upper Body Muscle Tone   Upper Body Muscle Tone  WDL   Coordination Upper Body   Coordination Not Tested   Balance Assessment   Sitting Balance (Static) Fair   Sitting Balance (Dynamic) Fair   Standing Balance (Static) Fair -   Standing Balance (Dynamic) Fair -   Weight Shift Sitting Fair   Weight Shift Standing Fair   Comments w/ FWW   Bed Mobility    Supine to Sit Supervised   Sit to Supine Contact Guard Assist   Scooting Supervised   Rolling Supervised   Comments HOB slightly raised, use of rails   ADL Assessment   Grooming   (declined)   Lower Body Dressing Standby Assist  (don socks)   Toileting   (declined the need)   Functional Mobility   Sit to Stand Minimal  Assist   Mobility EOB side steps only   Comments w/ FWW   Visual Perception   Visual Perception  Not Tested   Activity Tolerance   Sitting in Chair NT   Sitting Edge of Bed >10 min   Standing ~5-10 min standing EOB   Comments no reports of fatigue, pt reported some abdominal soreness   Patient / Family Goals   Patient / Family Goal #1 to be more independent   Short Term Goals   Short Term Goal # 1 Pt will perform ADL txf w/ supv   Short Term Goal # 2 Pt will perform g/h w/ supv   Short Term Goal # 3 Pt will perform toileting w/ supv   Education Group   Education Provided Role of Occupational Therapist;Activities of Daily Living;Pathology of bedrest  (limiting bending and twisting for comfort)   Role of Occupational Therapist Patient Response Patient;Acceptance;Explanation;Verbal Demonstration   ADL Patient Response Patient;Acceptance;Explanation;Verbal Demonstration;Action Demonstration;Demonstration   Pathology of Bedrest Patient Response Patient;Acceptance;Explanation;Verbal Demonstration   Problem List   Problem List Decreased Active Daily Living Skills;Decreased Homemaking Skills;Decreased Upper Extremity Strength Right;Decreased Upper Extremity Strength Left;Decreased Functional Mobility;Decreased Activity Tolerance;Impaired Postural Control / Balance

## 2022-10-17 NOTE — CARE PLAN
The patient is Stable - Low risk of patient condition declining or worsening    Shift Goals  Clinical Goals: Pain control, nausea control, mobility, anx control  Patient Goals: Rest, pain control  Family Goals: no family at bedside    Progress made toward(s) clinical / shift goals:  pt pain medicated per MAR, nausea medicated per MAR, pt encouraged to share feelings, SCD's on.      Problem: Fall Risk  Goal: Patient will remain free from falls  Outcome: Progressing     Problem: Knowledge Deficit - Standard  Goal: Patient and family/care givers will demonstrate understanding of plan of care, disease process/condition, diagnostic tests and medications  Outcome: Progressing     Problem: Pain - Standard  Goal: Alleviation of pain or a reduction in pain to the patient’s comfort goal  Outcome: Progressing

## 2022-10-17 NOTE — PROGRESS NOTES
Received report from previous shift RN  Assessment complete.  A&O x 4. Patient calls appropriately.  Patient ambulates with x1 assist FWW. Bed alarm off.   Patient has 3/10 pain. Pain managed with prescribed medications and bolus button PCA.  Denies N&V. Tolerating clear liquid diet.  Surgical MLI, dressing changed, CDI.  RLQ ileostomy, appliance leaking, seal reinforced, CDI.  RLQ ELLEN drain to bulb suction.  + void, + output via ileostomy  Patient denies SOB.  SCD's on.    Review plan of care with patient. Call light and personal belongings with in reach. Hourly rounding in place. All needs met at this time.

## 2022-10-17 NOTE — PROGRESS NOTES
Bedside report received, assessment completed    A&O x  4, pt calls appropriately  Mobility: Up with SBA  Fall Risk Assessment: low, bed alarm n/a, door notifications yes  Pain Assessment / Reassessment completed, medication provided per MAR  Diet: CLD, tolerating  LDA:   IV Access: 18G RFA, CDI/ flushed/ Infusing [id number]   ELLEN: RLQ, Serosang output, 80ml/ 24hr   GI/: allen removed today @1100 void, + flatus,  Ostomy , no output BM  DVT Prophylaxis: lovenox, SCD on while in bed   Alvaro Score: 20, Interventions per flow sheet  Procedures:    - 10/15 total proctocolectomy w/ illeostomy   D/C Plan:    - Continue CLD   - PT evaluation needed/ OT evaluation today 10/16   - ELLEN output     Reviewed plan of care with patient, bed in lowest position and locked, pt resting comfortably now, call light within reach, all needs met at this time. Interventions will be executed per plan of care

## 2022-10-17 NOTE — DIETARY
"Nutrition services: Day 5 of admit.  Zain Townsend is a 67 y.o. male with admitting DX of large bowel obstruction, colitis; ulcerative colitis with complication.    Consult received for NPO/clear liquid x  5 days today.  Hx of ulcerative colitis.  Pt found to have partial large bowel obstruction with 2 strictures in the mid and proximal rectum.  Pt is post operative day 2 of open total proctocolectomy with end ileostomy placement.  - output via ostomy    Assessment:  Height: 172.7 cm (5' 8\")  Weight: 64 kg (141 lb 1.5 oz)  Body mass index is 21.45 kg/m²., BMI classification: normal  Diet/Intake: Clear liquid; PO % for two meals thus far    Malnutrition Risk: No new risk identified.    Recommendations/Plan:  Diet advancement per MD.  Monitor weight.    RD will continue to follow.        "

## 2022-10-18 LAB
ANION GAP SERPL CALC-SCNC: 6 MMOL/L (ref 7–16)
BUN SERPL-MCNC: 10 MG/DL (ref 8–22)
CALCIUM SERPL-MCNC: 7.2 MG/DL (ref 8.5–10.5)
CHLORIDE SERPL-SCNC: 102 MMOL/L (ref 96–112)
CO2 SERPL-SCNC: 25 MMOL/L (ref 20–33)
CREAT SERPL-MCNC: 0.53 MG/DL (ref 0.5–1.4)
ERYTHROCYTE [DISTWIDTH] IN BLOOD BY AUTOMATED COUNT: 45.3 FL (ref 35.9–50)
GFR SERPLBLD CREATININE-BSD FMLA CKD-EPI: 109 ML/MIN/1.73 M 2
GLUCOSE SERPL-MCNC: 97 MG/DL (ref 65–99)
HCT VFR BLD AUTO: 30 % (ref 42–52)
HGB BLD-MCNC: 10.1 G/DL (ref 14–18)
MCH RBC QN AUTO: 31.2 PG (ref 27–33)
MCHC RBC AUTO-ENTMCNC: 33.7 G/DL (ref 33.7–35.3)
MCV RBC AUTO: 92.6 FL (ref 81.4–97.8)
PLATELET # BLD AUTO: 236 K/UL (ref 164–446)
PMV BLD AUTO: 10 FL (ref 9–12.9)
POTASSIUM SERPL-SCNC: 4.2 MMOL/L (ref 3.6–5.5)
RBC # BLD AUTO: 3.24 M/UL (ref 4.7–6.1)
SODIUM SERPL-SCNC: 133 MMOL/L (ref 135–145)
WBC # BLD AUTO: 10.1 K/UL (ref 4.8–10.8)

## 2022-10-18 PROCEDURE — 770001 HCHG ROOM/CARE - MED/SURG/GYN PRIV*

## 2022-10-18 PROCEDURE — 700101 HCHG RX REV CODE 250: Performed by: SURGERY

## 2022-10-18 PROCEDURE — A9270 NON-COVERED ITEM OR SERVICE: HCPCS | Performed by: SURGERY

## 2022-10-18 PROCEDURE — 85027 COMPLETE CBC AUTOMATED: CPT

## 2022-10-18 PROCEDURE — 36415 COLL VENOUS BLD VENIPUNCTURE: CPT

## 2022-10-18 PROCEDURE — 97162 PT EVAL MOD COMPLEX 30 MIN: CPT

## 2022-10-18 PROCEDURE — 302098 PASTE RING (FLAT): Performed by: SURGERY

## 2022-10-18 PROCEDURE — 97602 WOUND(S) CARE NON-SELECTIVE: CPT

## 2022-10-18 PROCEDURE — 302102 BAG OST N IMG 2.25IN 2PC (FECAL): Performed by: SURGERY

## 2022-10-18 PROCEDURE — 700111 HCHG RX REV CODE 636 W/ 250 OVERRIDE (IP): Performed by: SURGERY

## 2022-10-18 PROCEDURE — 700102 HCHG RX REV CODE 250 W/ 637 OVERRIDE(OP): Performed by: SURGERY

## 2022-10-18 PROCEDURE — 302111 WAFER OST 2.25IN N IMG RD 2 PC (BARRIER): Performed by: SURGERY

## 2022-10-18 PROCEDURE — 97535 SELF CARE MNGMENT TRAINING: CPT

## 2022-10-18 PROCEDURE — 80048 BASIC METABOLIC PNL TOTAL CA: CPT

## 2022-10-18 RX ADMIN — KETOROLAC TROMETHAMINE 15 MG: 30 INJECTION, SOLUTION INTRAMUSCULAR at 04:30

## 2022-10-18 RX ADMIN — ACETAMINOPHEN 1000 MG: 500 TABLET ORAL at 12:18

## 2022-10-18 RX ADMIN — POTASSIUM CHLORIDE, DEXTROSE MONOHYDRATE AND SODIUM CHLORIDE: 150; 5; 450 INJECTION, SOLUTION INTRAVENOUS at 11:09

## 2022-10-18 RX ADMIN — ACETAMINOPHEN 1000 MG: 500 TABLET ORAL at 05:47

## 2022-10-18 RX ADMIN — KETOROLAC TROMETHAMINE 15 MG: 30 INJECTION, SOLUTION INTRAMUSCULAR at 12:18

## 2022-10-18 RX ADMIN — PREDNISONE 20 MG: 20 TABLET ORAL at 04:30

## 2022-10-18 RX ADMIN — ENOXAPARIN SODIUM 40 MG: 40 INJECTION SUBCUTANEOUS at 17:21

## 2022-10-18 RX ADMIN — ACETAMINOPHEN 1000 MG: 500 TABLET ORAL at 17:21

## 2022-10-18 ASSESSMENT — COGNITIVE AND FUNCTIONAL STATUS - GENERAL
SUGGESTED CMS G CODE MODIFIER DAILY ACTIVITY: CJ
MOVING FROM LYING ON BACK TO SITTING ON SIDE OF FLAT BED: A LITTLE
STANDING UP FROM CHAIR USING ARMS: A LITTLE
TOILETING: A LITTLE
HELP NEEDED FOR BATHING: A LITTLE
SUGGESTED CMS G CODE MODIFIER MOBILITY: CJ
WALKING IN HOSPITAL ROOM: A LITTLE
WALKING IN HOSPITAL ROOM: A LITTLE
DAILY ACTIVITIY SCORE: 22
CLIMB 3 TO 5 STEPS WITH RAILING: A LITTLE
MOVING FROM LYING ON BACK TO SITTING ON SIDE OF FLAT BED: A LITTLE
STANDING UP FROM CHAIR USING ARMS: A LITTLE
MOBILITY SCORE: 19
CLIMB 3 TO 5 STEPS WITH RAILING: A LITTLE
MOVING TO AND FROM BED TO CHAIR: A LITTLE
MOBILITY SCORE: 20
SUGGESTED CMS G CODE MODIFIER MOBILITY: CK

## 2022-10-18 ASSESSMENT — GAIT ASSESSMENTS
DEVIATION: BRADYKINETIC;SHUFFLED GAIT
DISTANCE (FEET): 100
GAIT LEVEL OF ASSIST: STANDBY ASSIST
ASSISTIVE DEVICE: FRONT WHEEL WALKER

## 2022-10-18 ASSESSMENT — PAIN DESCRIPTION - PAIN TYPE
TYPE: ACUTE PAIN;SURGICAL PAIN

## 2022-10-18 NOTE — CARE PLAN
The patient is Stable - Low risk of patient condition declining or worsening    Shift Goals  Clinical Goals: Pain management, ostomy care, drain care  Patient Goals: Rest  Family Goals: no family at bedside    Progress made toward(s) clinical / shift goals:  pt pain medicated per MAR, ELLEN to bulb suction, ostomy appliance checked.      Problem: Fall Risk  Goal: Patient will remain free from falls  Outcome: Progressing     Problem: Knowledge Deficit - Standard  Goal: Patient and family/care givers will demonstrate understanding of plan of care, disease process/condition, diagnostic tests and medications  Outcome: Progressing     Problem: Pain - Standard  Goal: Alleviation of pain or a reduction in pain to the patient’s comfort goal  Outcome: Progressing

## 2022-10-18 NOTE — CARE PLAN
Problem: Fall Risk  Goal: Patient will remain free from falls  Outcome: Progressing     Problem: Knowledge Deficit - Standard  Goal: Patient and family/care givers will demonstrate understanding of plan of care, disease process/condition, diagnostic tests and medications  Outcome: Progressing     Problem: Pain - Standard  Goal: Alleviation of pain or a reduction in pain to the patient’s comfort goal  Outcome: Progressing   The patient is Stable - Low risk of patient condition declining or worsening    Shift Goals  Clinical Goals: OT  Patient Goals: pain control, moving around  Family Goals: no family at bedside    Progress made toward(s) clinical / shift goals:  Patient updated on POC    Patient is not progressing towards the following goals:

## 2022-10-18 NOTE — CARE PLAN
Problem: Fall Risk  Goal: Patient will remain free from falls  10/18/2022 1205 by CHANDLER MendozaN.  Outcome: Progressing  10/18/2022 1159 by CHANDLER MendozaN.  Outcome: Progressing     Problem: Pain - Standard  Goal: Alleviation of pain or a reduction in pain to the patient’s comfort goal  10/18/2022 1205 by CHANDLER MendozaN.  Outcome: Progressing  10/18/2022 1159 by Corey Cortez R.N.  Outcome: Progressing   The patient is Stable - Low risk of patient condition declining or worsening    Shift Goals  Clinical Goals: OT  Patient Goals: pain control, moving around  Family Goals: no family at bedside    Progress made toward(s) clinical / shift goals:  patient updated on POC    Patient is not progressing towards the following goals:

## 2022-10-18 NOTE — PROGRESS NOTES
Assumed patient care and received report from Jhonny OLIVEIRA Assessment completed. Pt A&Ox 4. Respirations are even and unlabored on room air. Pt reports pain at this time PCA pump). Medical patient, VS stable, call light and belongings within reach. POC updated (OT). Pt educated on room and call light, pt verbalized understanding. Communication board updated. Needs met.

## 2022-10-18 NOTE — PROGRESS NOTES
Received report from previous shift RN  Assessment complete.  A&O x 4. Patient calls appropriately.  Patient ambulates with x1 assist FWW. Bed alarm off.   Patient has 4/10 pain. Pain managed with prescribed medications and bolus button PCA.  Denies N&V. Tolerating clear liquid diet.  Surgical MLI, DIP, CDI.  RLQ ileostomy, appliance leaking, seal reinforced, CDI.  RLQ ELLEN drain to bulb suction.  + void, + output via ileostomy  Patient denies SOB.  SCD's on.     Review plan of care with patient. Call light and personal belongings with in reach. Hourly rounding in place. All needs met at this time.

## 2022-10-18 NOTE — PROGRESS NOTES
"10/18:  S:  67 y.o.male s/p Open Total Proctocolectomy with End Ileostomy  POD# 3.  Patient doing well, tolerating PO liquids, wants to advance diet today  Ileostomy is putting out stool and he denies any n/v    O:  /75   Pulse 63   Temp 36.6 °C (97.8 °F) (Temporal)   Resp 18   Ht 1.727 m (5' 8\")   Wt 64 kg (141 lb 1.5 oz)   SpO2 95%   I/O last 3 completed shifts:  In: 1003.5 [I.V.:1003.5]  Out: 1535 [Urine:1225; Drains:110]  Recent Labs     10/16/22  0522 10/17/22  0227 10/18/22  0258   SODIUM 131* 131* 133*   POTASSIUM 4.5 4.4 4.2   CHLORIDE 100 100 102   CO2 21 21 25   GLUCOSE 161* 102* 97   BUN 11 11 10   CREATININE 0.67 0.72 0.53   CALCIUM 7.0* 7.1* 7.2*     Recent Labs     10/16/22  0522 10/17/22  0227 10/18/22  0258   WBC 8.7 11.0* 10.1   RBC 4.11* 3.62* 3.24*   HEMOGLOBIN 12.9* 11.5* 10.1*   HEMATOCRIT 38.4* 34.0* 30.0*   MCV 93.4 93.9 92.6   MCH 31.4 31.8 31.2   MCHC 33.6* 33.8 33.7   RDW 45.2 46.4 45.3   PLATELETCT 295 262 236   MPV 10.4 10.3 10.0       Alert and Oriented x3, No Acute Distress  Normal Respiratory Effort  Abdomen soft, appropriately tender  Incisions/Bandages clean/dry/intact  Extremities warm and well perfused  Ostomy pink and healthy, output liquid stool  ELLEN Output Serosanguinous-110cc    A/P:  Pain control with PCA  Diet as tolerated, advance to GI soft today  Fluids continue until hydrating with PO intake  Ambulate tid and ad chrissie  Ostomy care and teaching    "

## 2022-10-18 NOTE — THERAPY
"Physical Therapy   Initial Evaluation     Patient Name: Zain Townsend  Age:  67 y.o., Sex:  male  Medical Record #: [id number]  Today's Date: 10/18/2022     Precautions  Precautions: Fall Risk (abdominal precautions)  Comments: ELLEN drain    Assessment  Patient is 67 y.o. male admitted with large bowel obstruction, now s/p total proctocolectomy with end ileostomy on 10/15. PMHx of ulcerative colitis, arthritis. Pt generally required SBA for mobility, further details below. Pt limited mainly 2/2 pain and BLE numbness, greater on the RLE causing buckling with fatigue. Pt lives alone and will need to negotiate a few steps in order to d/c home. Recommend home with HH once able to negotiate stairs. Will continue to follow to address pt's pain, impaired sensation, LE weakness, impaired balance and activity tolerance.     Plan    Recommend Physical Therapy 3 times per week until therapy goals are met for the following treatments:  Bed Mobility, Equipment, Gait Training, Manual Therapy, Neuro Re-Education / Balance, Self Care/Home Evaluation, Stair Training, Therapeutic Activities, and Therapeutic Exercises    DC Equipment Recommendations: None (has needed DME)  Discharge Recommendations: Recommend home health for continued physical therapy services (Once able to demo stair negotiation)       Subjective    \"I'm not worried about getting up and down the stairs.\"      Objective     10/18/22 1248   Vitals   O2 Delivery Device None - Room Air   Pain 0 - 10 Group   Therapist Pain Assessment Post Activity Pain Same as Prior to Activity;Nurse Notified  (intermittent c/o pain not rated, agreeable to mobility)   Prior Living Situation   Prior Services None   Housing / Facility 2 Story House  (split level)   Steps Into Home   (1-2 steps vs ramp?)   Steps In Home   (small FOS to go downstairs)   Rail Left Rail (Steps in Home);Left Rail  (Steps into Home)   Bathroom Set up Walk In Shower;Bathtub / Shower Combination;Shower Chair "   Equipment Owned 4-Wheel Walker;Wheelchair;Ramp;Single Point Cane;Raised Toilet Seat With Arms;Tub / Shower Seat  (fabby)   Lives with - Patient's Self Care Capacity Alone and Able to Care For Self   Comments Reports neighbors are assisting with his cat while he is in the hospital   Prior Level of Functional Mobility   Bed Mobility Independent   Transfer Status Independent   Ambulation Independent   Distance Ambulation (Feet)   (community)   Assistive Devices Used None   Stairs Independent   Cognition    Cognition / Consciousness WDL   Level of Consciousness Alert   Comments Pleasant and cooperative   Passive ROM Lower Body   Passive ROM Lower Body WDL   Active ROM Lower Body    Active ROM Lower Body  WDL   Strength Lower Body   Lower Body Strength  X   Comments BLE 5/5 upon testing, however, c/o buckling RLE with ambulation 2/2 numbness   Sensation Lower Body   Lower Extremity Sensation   X   Comments c/o BLE numbness R>L since surgery   Lower Body Muscle Tone   Lower Body Muscle Tone  WDL   Strength Upper Body   Upper Body Strength  X   Gross Strength Generalized Weakness, Equal Bilaterally.    Comments 4/5 BUE grossly   Upper Body Muscle Tone   Upper Body Muscle Tone  WDL   Coordination Upper Body   Coordination Not Tested   Coordination Lower Body    Coordination Lower Body  Not Tested   Balance Assessment   Sitting Balance (Static) Fair   Sitting Balance (Dynamic) Fair   Standing Balance (Static) Fair -   Standing Balance (Dynamic) Fair -   Weight Shift Sitting Fair   Weight Shift Standing Fair   Comments w/ FWW   Gait Analysis   Gait Level Of Assist Standby Assist   Assistive Device Front Wheel Walker   Distance (Feet) 100   # of Times Distance was Traveled 1   Deviation Bradykinetic;Shuffled Gait  (decreased kajal, decreased RLE stance time)   Weight Bearing Status no restrictions   Comments Did not attempt stairs 2/2 pt c/o RLE buckling with single leg stance due to numbness   Bed Mobility    Supine  to Sit Supervised   Sit to Supine Supervised   Scooting Supervised   Rolling Supervised   Comments HOB slightly raised. Cues for log roll   Functional Mobility   Sit to Stand Standby Assist   Bed, Chair, Wheelchair Transfer Standby Assist   Transfer Method Stand Step   Mobility w/ FWW in room   How much difficulty does the patient currently have...   Turning over in bed (including adjusting bedclothes, sheets and blankets)? 4   Sitting down on and standing up from a chair with arms (e.g., wheelchair, bedside commode, etc.) 3   Moving from lying on back to sitting on the side of the bed? 3   How much help from another person does the patient currently need...   Moving to and from a bed to a chair (including a wheelchair)? 3   Need to walk in a hospital room? 3   Climbing 3-5 steps with a railing? 3   6 clicks Mobility Score 19   Activity Tolerance   Sitting in Chair 5 min   Sitting Edge of Bed <5 min   Standing 10 min   Comments limited 2/2 RLE buckling   Edema / Skin Assessment   Edema / Skin  WDL   Comments ELLEN drain intact   Patient / Family Goals    Patient / Family Goal #1 to walk   Short Term Goals    Short Term Goal # 1 Pt will perform supine <> sit with SPV, log roll, and HOB flat in 6 visits to improve functional mobility   Short Term Goal # 2 Pt will perform STS transfers with FWW and SPV in 6 visits to get in/out of chair   Short Term Goal # 3 Pt will ambulate 150ft with FWW and SPV in 6 visits to access household distances   Short Term Goal # 4 Pt will negotiate 1 FOS with BUE support and SPV in 6 visits to safely negotiate all levels of pt's home (as needed)   Education Group   Education Provided Role of Physical Therapist;Gait Training;Use of Assistive Device   Role of Physical Therapist Patient Response Patient;Acceptance;Explanation;Verbal Demonstration   Gait Training Patient Response Patient;Acceptance;Explanation;Action Demonstration   Use of Assistive Device Patient Response  Patient;Acceptance;Explanation;Action Demonstration   Additional Comments Pt educated on self management and compensatory strategies with mobility   Problem List    Problems Impaired Bed Mobility;Impaired Transfers;Impaired Ambulation;Functional Strength Deficit;Pain;Impaired Balance;Decreased Activity Tolerance   Anticipated Discharge Equipment and Recommendations   DC Equipment Recommendations None  (has needed DME)   Discharge Recommendations Recommend home health for continued physical therapy services  (Once able to demo stair negotiation)   Interdisciplinary Plan of Care Collaboration   IDT Collaboration with  Nursing   Patient Position at End of Therapy In Bed;Call Light within Reach;Tray Table within Reach;Phone within Reach   Collaboration Comments RN updated   Session Information   Date / Session Number  10/18- 1 (1/3, 10/24)

## 2022-10-19 LAB
ANION GAP SERPL CALC-SCNC: 9 MMOL/L (ref 7–16)
BUN SERPL-MCNC: 11 MG/DL (ref 8–22)
CALCIUM SERPL-MCNC: 7.9 MG/DL (ref 8.5–10.5)
CHLORIDE SERPL-SCNC: 100 MMOL/L (ref 96–112)
CO2 SERPL-SCNC: 24 MMOL/L (ref 20–33)
CREAT SERPL-MCNC: 0.7 MG/DL (ref 0.5–1.4)
ERYTHROCYTE [DISTWIDTH] IN BLOOD BY AUTOMATED COUNT: 47.8 FL (ref 35.9–50)
GFR SERPLBLD CREATININE-BSD FMLA CKD-EPI: 100 ML/MIN/1.73 M 2
GLUCOSE SERPL-MCNC: 83 MG/DL (ref 65–99)
HCT VFR BLD AUTO: 31.1 % (ref 42–52)
HGB BLD-MCNC: 10.4 G/DL (ref 14–18)
MCH RBC QN AUTO: 31.7 PG (ref 27–33)
MCHC RBC AUTO-ENTMCNC: 33.4 G/DL (ref 33.7–35.3)
MCV RBC AUTO: 94.8 FL (ref 81.4–97.8)
PLATELET # BLD AUTO: 245 K/UL (ref 164–446)
PMV BLD AUTO: 10.2 FL (ref 9–12.9)
POTASSIUM SERPL-SCNC: 4.1 MMOL/L (ref 3.6–5.5)
RBC # BLD AUTO: 3.28 M/UL (ref 4.7–6.1)
SODIUM SERPL-SCNC: 133 MMOL/L (ref 135–145)
WBC # BLD AUTO: 12.7 K/UL (ref 4.8–10.8)

## 2022-10-19 PROCEDURE — 700111 HCHG RX REV CODE 636 W/ 250 OVERRIDE (IP): Performed by: SURGERY

## 2022-10-19 PROCEDURE — 700102 HCHG RX REV CODE 250 W/ 637 OVERRIDE(OP): Performed by: SURGERY

## 2022-10-19 PROCEDURE — 770001 HCHG ROOM/CARE - MED/SURG/GYN PRIV*

## 2022-10-19 PROCEDURE — 700101 HCHG RX REV CODE 250: Performed by: SURGERY

## 2022-10-19 PROCEDURE — 85027 COMPLETE CBC AUTOMATED: CPT

## 2022-10-19 PROCEDURE — 80048 BASIC METABOLIC PNL TOTAL CA: CPT

## 2022-10-19 PROCEDURE — A9270 NON-COVERED ITEM OR SERVICE: HCPCS | Performed by: SURGERY

## 2022-10-19 PROCEDURE — 36415 COLL VENOUS BLD VENIPUNCTURE: CPT

## 2022-10-19 RX ORDER — ACETAMINOPHEN 325 MG/1
650 TABLET ORAL EVERY 4 HOURS PRN
Status: DISCONTINUED | OUTPATIENT
Start: 2022-10-19 | End: 2022-10-20 | Stop reason: HOSPADM

## 2022-10-19 RX ORDER — PREDNISONE 10 MG/1
10 TABLET ORAL DAILY
Status: DISCONTINUED | OUTPATIENT
Start: 2022-10-20 | End: 2022-10-20 | Stop reason: HOSPADM

## 2022-10-19 RX ORDER — OXYCODONE AND ACETAMINOPHEN 10; 325 MG/1; MG/1
1 TABLET ORAL EVERY 4 HOURS PRN
Status: DISCONTINUED | OUTPATIENT
Start: 2022-10-19 | End: 2022-10-20 | Stop reason: HOSPADM

## 2022-10-19 RX ADMIN — PREDNISONE 20 MG: 20 TABLET ORAL at 04:45

## 2022-10-19 RX ADMIN — ENOXAPARIN SODIUM 40 MG: 40 INJECTION SUBCUTANEOUS at 16:32

## 2022-10-19 RX ADMIN — OXYCODONE AND ACETAMINOPHEN 1 TABLET: 10; 325 TABLET ORAL at 16:32

## 2022-10-19 RX ADMIN — ACETAMINOPHEN 1000 MG: 500 TABLET ORAL at 04:45

## 2022-10-19 RX ADMIN — POTASSIUM CHLORIDE, DEXTROSE MONOHYDRATE AND SODIUM CHLORIDE: 150; 5; 450 INJECTION, SOLUTION INTRAVENOUS at 04:45

## 2022-10-19 ASSESSMENT — PAIN DESCRIPTION - PAIN TYPE
TYPE: ACUTE PAIN;SURGICAL PAIN
TYPE: ACUTE PAIN
TYPE: ACUTE PAIN
TYPE: ACUTE PAIN;SURGICAL PAIN
TYPE: ACUTE PAIN
TYPE: ACUTE PAIN;SURGICAL PAIN
TYPE: ACUTE PAIN

## 2022-10-19 NOTE — PROGRESS NOTES
Received report from previous shift RN  Assessment complete.  A&O x 4. Patient calls appropriately.  Patient ambulates with standby assist FWW. Bed alarm off.   Patient has 4/10 pain. Pain managed with prescribed medications and bolus button PCA.  Denies N&V. Tolerating regular diet.  Surgical MLI, DIP, CDI.  RLQ ileostomy, CDI  RLQ ELLEN drain to bulb suction.  + void, + output via ileostomy  Patient denies SOB.  SCD's on.     Review plan of care with patient. Call light and personal belongings with in reach. Hourly rounding in place. All needs met at this time.

## 2022-10-19 NOTE — PROGRESS NOTES
"10/19:  S:  67 y.o.male s/p Total Proctocolectomy with End Ileostomy  POD# 1.  Patient doing steadily better.  He is tolerating solids PO, pain is well controlled with minimal PCA usage and he denies any n/v.  He is still weak with ambulation though and needs a lot of help with stoma care.  Given then, he is interested in SNF placement at this time.      O:  /78   Pulse 63   Temp 36.7 °C (98 °F) (Temporal)   Resp 18   Ht 1.727 m (5' 8\")   Wt 64 kg (141 lb 1.5 oz)   SpO2 97%   I/O last 3 completed shifts:  In: 2601.5 [I.V.:2601.5]  Out: 3030 [Urine:1370; Drains:70]  Recent Labs     10/17/22  0227 10/18/22  0258 10/19/22  0639   SODIUM 131* 133* 133*   POTASSIUM 4.4 4.2 4.1   CHLORIDE 100 102 100   CO2 21 25 24   GLUCOSE 102* 97 83   BUN 11 10 11   CREATININE 0.72 0.53 0.70   CALCIUM 7.1* 7.2* 7.9*     Recent Labs     10/17/22  0227 10/18/22  0258 10/19/22  0639   WBC 11.0* 10.1 12.7*   RBC 3.62* 3.24* 3.28*   HEMOGLOBIN 11.5* 10.1* 10.4*   HEMATOCRIT 34.0* 30.0* 31.1*   MCV 93.9 92.6 94.8   MCH 31.8 31.2 31.7   MCHC 33.8 33.7 33.4*   RDW 46.4 45.3 47.8   PLATELETCT 262 236 245   MPV 10.3 10.0 10.2       Alert and Oriented x3, No Acute Distress  Normal Respiratory Effort  Abdomen soft, appropriately tender  Incisions/Bandages clean/dry/intact  Extremities warm and well perfused  Ostomy pink and healthy, output liquid  ELLEN Output Serosanguinous-30cc    A/P:  Pain control with PO meds  Diet as tolerated  Fluids SLIV  Ambulate tid and ad chrissie  Weaning off prednisone, decreased dose to 10mg a day        "

## 2022-10-19 NOTE — CARE PLAN
The patient is Stable - Low risk of patient condition declining or worsening    Shift Goals  Clinical Goals: Movement, pain control  Patient Goals: Rest  Family Goals: \    Progress made toward(s) clinical / shift goals:  pt pain medicated per MAR, pt motivated for increased mobilization, pt belongings and call light within reach.      Problem: Fall Risk  Goal: Patient will remain free from falls  Outcome: Progressing     Problem: Knowledge Deficit - Standard  Goal: Patient and family/care givers will demonstrate understanding of plan of care, disease process/condition, diagnostic tests and medications  Outcome: Progressing     Problem: Pain - Standard  Goal: Alleviation of pain or a reduction in pain to the patient’s comfort goal  Outcome: Progressing

## 2022-10-19 NOTE — DISCHARGE PLANNING
Case Management Discharge Planning    Admission Date: 10/12/2022  GMLOS: 3  ALOS: 7    6-Clicks ADL Score: 22  6-Clicks Mobility Score: 19      Anticipated Discharge Dispo: Discharge Disposition: D/T to SNF with Medicare cert in anticipation of skilled care (03)    DME Needed: No    Action(s) Taken: Updated Provider/Nurse on Discharge Plan    This RN CM met with Pt at bedside. Zain is a very nice man who states that  he lives alone in [facility]. Zain' s physical address is [address] [place] [id number].     Zain states that [facility] personally called [facility] to inquire about services/bed availability.     Zain agreed that we send referral to these two SNFs. Choice was faxed to Nataliya [facility]ONZO.    Per Zain, once he is done with SNF his friend Jordan can take him to [facility] and from there his brother can provide transport to home.     [facility] and [facility] Pt's [facility] choice. Per Greg of [facility],  if Pt is medically clear tomorrow they have a bed available tomorrow. Informed Dr Samaniego via voalte.     Started Cobra    Escalations Completed: None    Medically Clear: No    Next Steps:   This RN CM to continue to assist Pt with discharge as needed    Barriers to Discharge:   Medical clearance      Is the patient up for discharge tomorrow: No

## 2022-10-19 NOTE — PROGRESS NOTES
Received report from previous shift RN  Assessment complete.  A&O x 4. Patient calls appropriately.  Patient ambulates with standby assist & FWW. Bed alarm off, patient low fall risk.   Patient has 5/10 pain. Pain managed with prescribed medications.  Denies N&V. Tolerating regular diet.  Surgical incision to midline with staples, DMITRIY, CDI. RLQ ileostomy with appliance in place, CDI. RLQ ELLEN drain to bulb suction, CDI.  + void, + flatus, + BM via ostomy.  Patient denies SOB. Spo2>95% on room air.  SCD's off.    Reviewed plan of care with patient. Call light and personal belongings with in reach. Hourly rounding in place. All needs met at this time.

## 2022-10-19 NOTE — DISCHARGE PLANNING
Received Choice form at 1031  Agency/Facility Name: [facility]  Referral sent per Choice form @ 1037     1124  Agency/Facility Name: [facility]   Spoke To: Greg   Outcome: Referral fe currently under review at this time. DPA waiting on a call back for updates.    9019  Agency/Facility Name: [facility]   Spoke To: Greg   Outcome: Per Greg, if Pt is m/c tomorrow, they can accept Pt.

## 2022-10-19 NOTE — DISCHARGE PLANNING
Care Transition Team Discharge Planning    Anticipated Discharge Information  Discharge Disposition: D/T to SNF with Medicare cert in anticipation of skilled care (03)              Discharge Plan:  SNF    Pt has a completed PASRR and the number is [id number].

## 2022-10-19 NOTE — DIETARY
Nutrition Services Brief Update:    Day 7 of admit.  Zain Townsend is a 67 y.o. male with admitting DX of Ulcerative colitis with complication (HCC) [K53.204]    Current Diet: Low Fiber (GI soft)    Problem: Nutritional:  Goal: Achieve adequate nutritional intake  Description: Patient will consume 50% of meals, once diet advances past NPO/Clears  Outcome: Progressing  Diet advanced to Low Fiber (GI soft) yesterday.  PO for one meal >50% thus far.  RD will continue to monitor for adequate PO intake.

## 2022-10-19 NOTE — CARE PLAN
The patient is Stable - Low risk of patient condition declining or worsening    Shift Goals  Clinical Goals: pain control, oob activity  Patient Goals: Rest  Family Goals: \    Progress made toward(s) clinical / shift goals:  Patient educated on non-pharmacological pain modalities. Pain managed per MAR. Patient ambulating to bathroom frequently.    Patient is not progressing towards the following goals:

## 2022-10-20 VITALS
DIASTOLIC BLOOD PRESSURE: 75 MMHG | HEART RATE: 60 BPM | BODY MASS INDEX: 21.38 KG/M2 | HEIGHT: 68 IN | OXYGEN SATURATION: 97 % | RESPIRATION RATE: 17 BRPM | TEMPERATURE: 97 F | WEIGHT: 141.09 LBS | SYSTOLIC BLOOD PRESSURE: 115 MMHG

## 2022-10-20 LAB
ANION GAP SERPL CALC-SCNC: 7 MMOL/L (ref 7–16)
BUN SERPL-MCNC: 16 MG/DL (ref 8–22)
CALCIUM SERPL-MCNC: 8.2 MG/DL (ref 8.5–10.5)
CHLORIDE SERPL-SCNC: 101 MMOL/L (ref 96–112)
CO2 SERPL-SCNC: 26 MMOL/L (ref 20–33)
CREAT SERPL-MCNC: 0.7 MG/DL (ref 0.5–1.4)
ERYTHROCYTE [DISTWIDTH] IN BLOOD BY AUTOMATED COUNT: 47.7 FL (ref 35.9–50)
GFR SERPLBLD CREATININE-BSD FMLA CKD-EPI: 100 ML/MIN/1.73 M 2
GLUCOSE SERPL-MCNC: 77 MG/DL (ref 65–99)
HCT VFR BLD AUTO: 28.8 % (ref 42–52)
HGB BLD-MCNC: 9.6 G/DL (ref 14–18)
MCH RBC QN AUTO: 31.6 PG (ref 27–33)
MCHC RBC AUTO-ENTMCNC: 33.3 G/DL (ref 33.7–35.3)
MCV RBC AUTO: 94.7 FL (ref 81.4–97.8)
PLATELET # BLD AUTO: 261 K/UL (ref 164–446)
PMV BLD AUTO: 9.9 FL (ref 9–12.9)
POTASSIUM SERPL-SCNC: 4.1 MMOL/L (ref 3.6–5.5)
RBC # BLD AUTO: 3.04 M/UL (ref 4.7–6.1)
SARS-COV+SARS-COV-2 AG RESP QL IA.RAPID: NOTDETECTED
SODIUM SERPL-SCNC: 134 MMOL/L (ref 135–145)
SPECIMEN SOURCE: NORMAL
WBC # BLD AUTO: 10.6 K/UL (ref 4.8–10.8)

## 2022-10-20 PROCEDURE — 97602 WOUND(S) CARE NON-SELECTIVE: CPT

## 2022-10-20 PROCEDURE — 87426 SARSCOV CORONAVIRUS AG IA: CPT

## 2022-10-20 PROCEDURE — 85027 COMPLETE CBC AUTOMATED: CPT

## 2022-10-20 PROCEDURE — 36415 COLL VENOUS BLD VENIPUNCTURE: CPT

## 2022-10-20 PROCEDURE — A9270 NON-COVERED ITEM OR SERVICE: HCPCS | Performed by: SURGERY

## 2022-10-20 PROCEDURE — 700102 HCHG RX REV CODE 250 W/ 637 OVERRIDE(OP): Performed by: SURGERY

## 2022-10-20 PROCEDURE — 80048 BASIC METABOLIC PNL TOTAL CA: CPT

## 2022-10-20 PROCEDURE — 700111 HCHG RX REV CODE 636 W/ 250 OVERRIDE (IP): Performed by: SURGERY

## 2022-10-20 RX ORDER — OXYCODONE HYDROCHLORIDE AND ACETAMINOPHEN 5; 325 MG/1; MG/1
1-2 TABLET ORAL EVERY 4 HOURS PRN
Qty: 30 TABLET | Refills: 0 | Status: SHIPPED | OUTPATIENT
Start: 2022-10-20 | End: 2022-10-27

## 2022-10-20 RX ORDER — PREDNISONE 10 MG/1
10 TABLET ORAL DAILY
Qty: 14 TABLET | Refills: 0 | Status: SHIPPED | OUTPATIENT
Start: 2022-10-20

## 2022-10-20 RX ADMIN — PREDNISONE 10 MG: 10 TABLET ORAL at 06:15

## 2022-10-20 RX ADMIN — OXYCODONE AND ACETAMINOPHEN 1 TABLET: 10; 325 TABLET ORAL at 08:25

## 2022-10-20 ASSESSMENT — PAIN DESCRIPTION - PAIN TYPE
TYPE: ACUTE PAIN

## 2022-10-20 NOTE — CARE PLAN
Problem: Knowledge Deficit - Standard  Goal: Patient and family/care givers will demonstrate understanding of plan of care, disease process/condition, diagnostic tests and medications  Outcome: Progressing     Problem: Pain - Standard  Goal: Alleviation of pain or a reduction in pain to the patient’s comfort goal  Outcome: Progressing   The patient is Stable - Low risk of patient condition declining or worsening    Shift Goals  Clinical Goals: pain control  Patient Goals: ambulate  Family Goals: \    Progress made toward(s) clinical / shift goals:  pt denies pain at this time.  POC discussed with pt, pt verbalized understanding of plan.

## 2022-10-20 NOTE — DISCHARGE PLANNING
Agency/Facility Name: [facility]  Spoke To: Greg  Outcome: DPA requested update on bed availability, as SNF has accepted Pt. SNF has a bed available today. DPA requesting SNF's transport services for transport time for [phone], per RN CM. SNF to speak with  to verify transport availability and will call back DPA with update.     RN CM notified.       1112-  [facility]ency[facility]Facility Name: [facility]  Outcome: [facility] left v-mail informing DPA that they do not have transport available today. SNF is requesting that CMA team set up transport services for today.     RN CM notified.     1115-  Agency/Facility Name: [facility]  Spoke To: Greg  Outcome: DPA informed SNF that RN CHRISTINA is setting up transport for 1500 today, SNF says that time works for them. DPA to call back SNF today once confirmed transport time is received.     RN CHRISTINA notified.     1305-  Agency/Facility Name: [facility]  Spoke To: Greg  Outcome: DPA confirmed transport for 1430 today. DPA provided Pt's SSN, as requested by SNF.

## 2022-10-20 NOTE — PROGRESS NOTES
Report received from Sol RN, assumed care at 1900  A0x4  Pt declines any SOB on room air, chest pain, new onset of numbness/tingling  Pt rates pain at 0/10, on a scale of 1-10  + voiding   Pt has + flatus, + bowel sounds, BM via RLQ ostomy- pt emptying bag independently  Pt ambulates with a standby assist and FWW  Pt is tolerating a  regular diet, pt denies any nausea/vomiting  MDI with staples open to air, RLQ ELLEN with serosang. output  Plan of care discussed, all questions answered.Call light is within reach, treaded slipper socks on, bed in lowest/locked position, hourly rounding in place, all needs met at this time.

## 2022-10-20 NOTE — DISCHARGE PLANNING
Meds-to-Beds: Discharge prescription orders listed below delivered to patient's bedside. RN Janna notified. Patient counseled. Patient elected to have co-payment billed to patient account.     Later notified by RN that patient discharging to SNF and she will return medications to [facility] pharmacy.      Current Outpatient Medications   Medication Sig Dispense Refill    predniSONE (DELTASONE) 10 MG Tab Take 1 Tablet by mouth every day for 1 week then take 1/2 tablet (5 mg) daily for 2 weeks 14 Tablet 0    oxyCODONE-acetaminophen (PERCOCET) 5-325 MG Tab Take 1-2 Tablets by mouth every four hours as needed for Severe Pain for up to 7 days. 30 Tablet 0      Mara Ely, PharmD

## 2022-10-20 NOTE — DISCHARGE SUMMARY
Discharge Summary      DATE OF ADMISSION: 10/12/2022    DATE OF DISCHARGE: 10/20/2022    ADMISSION DIAGNOSIS (ES):  Ulcerative Colitis with Large Bowel Obstruction    DISCHARGE DIAGNOSIS (ES):  same    DISCHARGE CONDITION:  stable    CONSULTATIONS:  none    PROCEDURES:  Open Total Proctocolectomy with End Ileostomy Placement    BRIEF HPI:  67y M here with ulcerative colitis which is a longstanding issue for him, now complicated by severe strictures in the rectum, These are causing a near-complete obstruction and he was recently admitted for this issue.  He continues to have bloating and pain and was sent to the hospital ER after appointment for admission and surgical planning    HOSPITAL COURSE:  After admission, tp was placed NPO and given fluids.  He had some minimal stool pass but remained distended.  He elected to have a single definitive procedure for his UC with permanent ostomy placement.    Pt underwent surgery and had an expected post-op recovery.  He was able to control his pain post-op and had return of bowel function.  He was able to tolerate solid foods again.  Wounds continued to heal well during admission  and staples and ELLEN drain were removed before discharge.  Pt had issues with ostomy care due to pre-existing arthritis.  He also had some post-op weakness.  He was referred to and accepted at SNF for ongoing care after discharge from the hospital.      MEDS:   Current Outpatient Medications   Medication Sig Dispense Refill    predniSONE (DELTASONE) 10 MG Tab Take 1 Tablet by mouth every day for 1 week then take 1/2 tablet (5 mg) daily for 2 weeks 14 Tablet 0    oxyCODONE-acetaminophen (PERCOCET) 5-325 MG Tab Take 1-2 Tablets by mouth every four hours as needed for Severe Pain for up to 7 days. 30 Tablet 0       FOLLOW-UP:  Please call my office at [phone] to make an appointment in 1 weeks    DISCHARGE INSTRUCTIONS:        
no

## 2022-10-20 NOTE — PROGRESS NOTES
10/20:  Pt seen and examined, ambulating, tolerating diet, ileostomy functioning.  Still needing PT and help with ostomy care.  Accepted to SNF, ok for transfer later today after seen by wound care.

## 2022-10-20 NOTE — DISCHARGE INSTRUCTIONS
Diet as tolerated- recommend small frequent meals throughout the day and plenty of fluids  Ok to shower with wounds and ostomy uncovered  Ambulate as much as able.  No heavy lifting for 6 weeks post-op  Ileostomy care and teaching  Proctocolectomy, Care After  This sheet gives you information about how to care for yourself after your procedure. Your health care provider may also give you more specific instructions. If you have problems or questions, contact your health care provider.  What can I expect after the procedure?  After the procedure, it is common to have:  Pain in the abdominal or rectal area, especially when coughing or sneezing.  Soreness in the rectal area when sitting.  Redness or swelling around the opening (stoma) in your abdomen.  A small amount of blood in your stool.  Drainage or leaking of stool or mucus.  A feeling that you need to have a bowel movement for a few days after the procedure.  Temporary sexual dysfunction in men.  Follow these instructions at home:  Medicines  Take over-the-counter and prescription medicines only as told by your health care provider.  If you were prescribed an antibiotic medicine, take it as told by your health care provider. Do not stop taking the antibiotic even if you start to feel better.  Stoma and incision care    Keep your stoma and the surrounding skin clean and dry.  Follow instructions from your health care provider about how to take care of your incision and stoma areas. Make sure you:  Wash your hands with soap and water before you change your bandage (dressing). If soap and water are not available, use hand .  Change your dressing as told by your health care provider.  Leave stitches (sutures), skin glue, or adhesive strips in place. These skin closures may need to stay in place for 2 weeks or longer. If adhesive strip edges start to loosen and curl up, you may trim the loose edges. Do not remove adhesive strips completely unless your health  care provider tells you to do that.  Check your incision and stoma areas every day for signs of infection. Check for:  More redness, swelling, or pain.  More fluid or blood.  Warmth.  Pus or a bad smell.  Follow instructions from your health care provider about changing and cleaning your ostomy pouch.  Keep supplies to care for your stoma and ostomy pouch with you at all times.  Eating and drinking  Follow instructions from your health care provider about eating or drinking restrictions. This may include:  Eating a soft diet.  Eating three snacks and three small meals each day, instead of three large meals.  Avoiding greasy foods and foods that cause gas.  Drink enough fluid to keep your urine clear or pale yellow.  Activity  Rest as needed while the stoma area heals.  Return to your normal activities as told by your health care provider. Ask your health care provider what activities are safe for you.  Move around as much as possible. Gradually increase your daily activity. Movement is good for recovery and healing.  Avoid contact sports and activities that require a lot of energy (are strenuous) for as long as told by your health care provider.  Do not lift anything that is heavier than 10 lb (4.5 kg) until your health care provider says that it is safe.  Do not drive or use heavy machinery until your health care provider approves.  General instructions  Follow instructions from your health care provider about how to empty or change the ostomy pouch.  Do not take baths, swim, or use a hot tub until your health care provider approves.  Wear loose-fitting clothing.  To reduce the pain when you sneeze or cough, try pressing gently with your palm on the incision area.  Sit on a soft, foam pillow for comfort. Do not sit for long periods of time.  Keep all follow-up visits as told by your health care provider. This is important.  Contact a health care provider if:  You have pain in your abdomen that does not go away or  gets worse.  You have swelling in your abdomen.  You have severe diarrhea.  You have trouble urinating.  You feel nauseous or you vomit.  You have not had a bowel movement in several days.  You have black or tarry stools.  There is blood in your stool, beyond what is expected for the first few days.  The skin around the incision is breaking apart.  You have more redness, swelling, or pain around your stoma or incision.  You have more fluid or blood coming from your stoma or incision.  Your stoma or incision feels warm to the touch.  You have pus or a bad smell coming from your stoma or incision.  You feel unusual pressure in your rectal area.  Get help right away if:  You are short of breath.  You have red, painful, or swollen areas in one or both legs.  You have chest pain.  You have a fever.  This information is not intended to replace advice given to you by your health care provider. Make sure you discuss any questions you have with your health care provider.  Document Released: 12/23/2014 Document Revised: 04/10/2020 Document Reviewed: 11/09/2017  [employer] Patient Education © 2020 [employer]    Ileostomy, Care After  This sheet gives you information about how to care for yourself after your procedure. Your health care provider may also give you more specific instructions. If you have problems or questions, contact your health care provider.  What can I expect after the procedure?  After the procedure, it is common to have:  A small amount of blood or clear fluid leaking from your stoma.  Pain and discomfort in your abdomen, especially around your stoma.  Irregular bowel movements for several days.  Loose stool.  Follow these instructions at home:  Medicines  Take over-the-counter and prescription medicines only as told by your health care provider.  If you were prescribed an antibiotic medicine, take it as told by your health care provider. Do not stop taking the antibiotic even if you start to feel  better.  Stoma and incision care    Keep the skin that surrounds your stoma clean and dry.  Follow instructions from your health care provider about how to take care of your incision. Make sure you:  Wash your hands with soap and water before and after you change your bandage (dressing). If soap and water are not available, use hand .  Change your dressing as told by your health care provider.  Leave stitches (sutures), skin glue, or adhesive strips in place. These skin closures may need to stay in place for 2 weeks or longer. If adhesive strip edges start to loosen and curl up, you may trim the loose edges. Do not remove adhesive strips completely unless your health care provider tells you to do that.  Check your stoma area every day for signs of infection. Check for:  More redness, swelling, or pain.  More fluid or blood.  Warmth.  Pus or a bad smell.  Follow your health care provider's instructions about changing and cleaning your ostomy pouch.  Keep supplies with you at all times to care for your stoma and ostomy pouch. Also keep extra clothing with you.  Eating and drinking    Follow instructions from your health care provider about eating or drinking restrictions.  Pay attention to which foods and drinks cause problems with digestion, such as gas, constipation, or diarrhea.  Avoid spicy foods and caffeine while your stoma heals.  Eat meals and snacks at regular intervals.  Drink enough fluid to keep your urine pale yellow.  Activity    Return to your normal activities as told by your health care provider. Ask your health care provider what activities are safe for you.  Rest as much as possible while your stoma heals.  Avoid intense physical activity for as long as you are told by your health care provider.  Do not lift anything that is heavier than 10 lb (4.5 kg), or the limit that you are told, for 6 weeks or until your health care provider says that it is safe.  General instructions  Do not drive or  use heavy machinery while taking prescription pain medicine.  Wear compression stockings as told by your health care provider. These stockings help to prevent blood clots and reduce swelling in your legs.  Do not take baths, swim, or use a hot tub until your health care provider approves. Ask your health care provider if you may take showers.  Do not use any products that contain nicotine or tobacco, such as cigarettes, e-cigarettes, and chewing tobacco. These can delay incision healing after surgery. If you need help quitting, ask your health care provider.  (Women) Talk with your health care provider if you plan to become pregnant or if you take birth control pills.  Keep all follow-up visits as told by your health care provider. This is important.  Contact a health care provider if:  You have more redness, swelling, or pain at or around your stoma.  You have more fluid or blood coming from your stoma.  Your stoma feels warm to the touch.  You have pus or a bad smell coming from your stoma.  You have a fever.  You have loose stools that do not become thicker after several weeks.  You have bowel movements more often or less often than your health care provider tells you to expect.  You feel nauseous.  You vomit.  You have abdominal pain, bloating, pressure, or cramping.  You have problems with sexual activity.  You have an unusual lack of energy (fatigue).  You are unusually thirsty or you always have a dry mouth.  Get help right away if:  You feel dizzy or light-headed.  You have pain or cramps in your abdomen that get worse or do not go away with medicine.  Your stoma suddenly changes size or color.  You have shortness of breath.  You have bleeding from your stoma that does not stop.  You vomit more than one time.  You faint.  You have internal tissue coming out of your stoma (prolapse).  You have an irregular heartbeat.  You have chest pain.  Summary  Take over-the-counter and prescription medicines only as told  by your health care provider.  Follow your health care provider's instructions about how to take care of your incision and stoma.  Follow instructions from your health care provider about eating or drinking restrictions.  Do not take baths, swim, or use a hot tub until your health care provider approves. Ask your health care provider if you may take showers.  Contact a health care provider if you have more redness, swelling, or pain at or around your stoma.  This information is not intended to replace advice given to you by your health care provider. Make sure you discuss any questions you have with your health care provider.  Document Released: 11/28/2016 Document Revised: 08/26/2019 Document Reviewed: 08/26/2019  [employer]vier Patient Education © 2020 [employer]

## 2022-10-20 NOTE — PROGRESS NOTES
Received report from previous shift RN  Assessment complete.  A&O x 4. Patient calls appropriately.  Patient ambulates up self with FWW .  Patient has 7/10 pain. Pain managed with prescribed medications.  Denies N&V. Tolerating regular diet.  RLQ ostomy with appliance in place, CDI. MLI staples, DMITRIY, CDI. RLQ ELLEN drain to bulb suction, CDI.  + void, + flatus, + BM via ostomy.  Patient denies SOB. Spo2>95% on room air.  SCD's off, patient ambulating frequently.    Reviewed plan of care with patient. Call light and personal belongings with in reach. Hourly rounding in place. All needs met at this time.

## 2022-10-20 NOTE — DISCHARGE PLANNING
Case Management Discharge Planning    Admission Date: 10/12/2022  GMLOS: 5.3  ALOS: 8    6-Clicks ADL Score: 22  6-Clicks Mobility Score: 19      Anticipated Discharge Dispo: Discharge Disposition: D/T to SNF with Medicare cert in anticipation of skilled care (03)    DME Needed: No    Action(s) Taken: Updated Provider/Nurse on Discharge Plan  This RN CM informed Dr Samaniego and RN Sol that Pt needs a covid test today.  Pt has been accepted at [facility].    Will prepare [facility]/transfer packet.     Per RN Sol, Pt should be able to transport via [facility]. Sent a rideline request to [facility] and informed [facility] via voalte.     This RN CM requested Dr Samaniego for Discharge Order and Summary and actual narcotic script if Pt needs it, informed RN Sol.     [facility]/ Transfer packet  provided to RN Sol.   Escalations Completed: None    Medically Clear: Yes    Next Steps:   This RN CM to continue to assist Pt with discharge as needed    Barriers to Discharge:   None    Is the patient up for discharge tomorrow: No

## 2022-10-20 NOTE — PROGRESS NOTES
1347- This RN attempted to call report to nursing staff at [facility], no answer. This RN left voicemail with call back number.  1411- This RN attempted to call report to nursing staff at [facility] again with no answer. No call back received.  1414-This RN attempted to call report to nursing staff at [facility] once again, no answer received. No call back received.   1430- Patient transportation at bedside now, this RN attempting to call [facility] for report again, No answer. This RN left voicemail with call back number.

## 2022-10-20 NOTE — DISCHARGE PLANNING
DC Transport Scheduled    Received request at: 10/20/2022 1144    Transport Company Scheduled:  [facility]  Spoke with Cipriano at [facility] to schedule transport.    Scheduled Date: 10/20/2022  Scheduled Time: 1430    Destination: [facility]    Notified care team of scheduled transport via Voalte.     If there are any changes needed to the DC transportation scheduled, please contact [facility] at ext. [phone] between the hours of 7385-2661 Mon-Fri. If outside those hours, contact the ED Case Manager at ext. [phone].

## 2022-10-20 NOTE — PROGRESS NOTES
Mara from pharmacy delivered patients prescribed medications. Patient is discharging to SNF, per Case management medications are not needed to be sent with patient. MD to write paper scripts for SNF. This RN is sending Unit clerk Luna to return medications to Pharmacy.

## 2022-10-21 NOTE — PROGRESS NOTES
Transferring patient to [facility] per physician order. Patient with [facility] transport. Belongings with patient at time of transfer. Questions answered.  Pt and family informed of transfer plan. Cobra form completed and chart copy and copy of cobra sent with patient.

## 2022-10-21 NOTE — WOUND TEAM
" [facility]  Inpatient Services  New Ostomy Management & Teaching    HPI:  Reviewed  PMH: Reviewed   SH: Reviewed    Subjective: \"I want to try the moldable today to practice\"    Objective: in bed, discharging to SNF.                         Ileostomy 10/15/22 Standard ([place], end) (Active)   Wound Image   10/18/22 1200   Stomal Appliance Assessment Clean;Dry;Intact;Changed    Stoma Assessment Beefy red    Stoma Shape Budded Less Than One Inch;Oval    Stoma Size (in) 1.5    Peristomal Assessment Intact    Mucocutaneous Junction Intact    Treatment Appliance Changed;Bag Change;Cleansed with water/washcloth;Crusted with stoma powder;Site care    Peristomal Protectant Paste Ring;No Sting Skin Prep;Stoma Powder    Stomal Appliance Paste Ring, 2\";2 1/4\" (57mm) CTF    Output (mL) 200 mL    Output Color Brown    WOUND RN ONLY - Stomal Appliance  2 Piece;Paste Ring, 2\";2 3/4\" Moldable    Appliance (Pouch) # Pouch: 65174, Barrier: 16120, Paste Rin    Appliance Brand IMASTE    Appliance Supplier [employer]    Secure Start completed Yes    Ostomy Care Resources Provided [facility] Tip Sheet    WOUND NURSE ONLY - Time Spent with Patient (mins) 75                           Ostomy Appliance (type and size): 2.25\" 2 piece and 2\" Paste Ring    Interventions: All questions answered, this RN then assisted pt with change. Pt removed appliance using push pull method. Stoma and peristomal skin was cleansed by this RN. Crusted with stoma powder and no sting skin prep - answered patient questions regarding crusting. Patient removed plastic backing from barrier and stretched moldable barrier and \"Dog Eared\" paper edges. Pt then stretched paste ring to fit barrier opening and applied down to back of barrier. Patient then attached bag to back of barrier (wanted to practice applying as one piece), and closed and creased end. This RN assisted placement of bag on patient. Gently pressed in place.        Pt education: Questions " and concerns addressed    Evaluation: Pt took several notes, all questions answered. [employer] form will be filled out and faxed.     Flatus: Present  Stool Output: small and brown  Diet: Fulls  Mobility: Not Mobilizing    Plan: Ostomy nurses to continue to follow for ostomy needs and teaching until discharge    Anticipated discharge needs: Supplies, supplier information, possible HH, outpatient ostomy clinic, Skilled Nursing/Rehab     Secure Start Signed Yes  Outpatient Referral Placed Declined  5 Sets of appliances in Ostomy bag for discharge Yes    INSURANCE OPTIONS:                 [facility] [employer]        X      MediCARE/MEDICAID & All other Private Insurance companies ([employer] Form)              MediCAID & Fee for Service (Care Chest Paperwork + [employer] Form)                            Form signed/Catalog Marked and Copy left with patient OR medicaid paperwork given to patient      Anticipated Discharge Plans:  Self Care

## 2022-10-25 LAB — PATHOLOGY CONSULT NOTE: NORMAL

## (undated) DEVICE — SUTURE 3-0 27IN PDS PLUS CLR - SH (36/BX)

## (undated) DEVICE — SEAL 5MM-8MM UNIVERSAL  BOX OF 10

## (undated) DEVICE — TOWEL STOP TIMEOUT SAFETY FLAG (40EA/CA)

## (undated) DEVICE — DRAPE MAYO STAND - (30/CA)

## (undated) DEVICE — STAPLER 75MM LINEAR OPEN (3EA/BX)

## (undated) DEVICE — STAPLER SKIN DISP - (6/BX 10BX/CA) VISISTAT

## (undated) DEVICE — SUTURE 0 COATED VICRYL 6-18IN - (12PK/BX)

## (undated) DEVICE — SET SUCTION/IRRIGATION WITH DISPOSABLE TIP (6/CA )PART #0250-070-520 IS A SUB

## (undated) DEVICE — GLOVE BIOGEL PI INDICATOR SZ 6.5 SURGICAL PF LF - (50/BX 4BX/CA)

## (undated) DEVICE — STAPLER 30MM OPEN GREEN 4.8MM W/STAPLE (3EA/BX)

## (undated) DEVICE — GOWN WARMING STANDARD FLEX - (30/CA)

## (undated) DEVICE — KIT 2.25IN COL ILSTM 2 PC DRN - 57MM 2 1/4 INCH THIS IS FOR THE OR ONLY (5/BX)

## (undated) DEVICE — SODIUM CHL IRRIGATION 0.9% 1000ML (12EA/CA)

## (undated) DEVICE — SUCTION INSTRUMENT YANKAUER BULBOUS TIP W/O VENT (50EA/CA)

## (undated) DEVICE — SHEARS MONOPOLAR CURVED  DA VINCI 10X'S REUSABLE

## (undated) DEVICE — SUTURE 4-0 MONOCRYL PLUS PS-2 - 27 INCH (36/BX)

## (undated) DEVICE — SET EXTENSION WITH 2 PORTS (48EA/CA) ***PART #2C8610 IS A SUBSTITUTE*****

## (undated) DEVICE — Device

## (undated) DEVICE — DRESSING LEUKOMED STERILE 11.75X4IN - (50/CA)

## (undated) DEVICE — TUBE E-T HI-LO CUFF 7.5MM (10EA/PK)

## (undated) DEVICE — GLOVE SZ 6.5 BIOGEL PI MICRO - PF LF (50PR/BX)

## (undated) DEVICE — GAUZE FLUFF STERILE 2-PLY 36 X 36 (100EA/CA)

## (undated) DEVICE — GLOVE BIOGEL SZ 7.5 SURGICAL PF LTX - (50PR/BX 4BX/CA)

## (undated) DEVICE — GOWN SURGEONS X-LARGE - DISP. (30/CA)

## (undated) DEVICE — SUTURE 2-0 20CM STRATAFIX SPIRAL SH NEEDLE (12/BX)

## (undated) DEVICE — OBTURATOR BLADELESS STANDARD 8MM (6EA/BX)

## (undated) DEVICE — SUTURE 2-0 VICRYL PLUS TP-1 - (24/BX)

## (undated) DEVICE — SUTURE 2-0 ETHILON FS - (36/BX) 18 INCH

## (undated) DEVICE — JELLY SURGILUBE STERILE TUBE 4.25 OZ (1/EA)

## (undated) DEVICE — SEALER VESSEL EXTEND FROM DAVINCI ENERGY (6EA/BX)

## (undated) DEVICE — PENCIL ELECTSURG 10FT BTN SWH - (50/CA)

## (undated) DEVICE — TUBE CONNECT SUCTION CLEAR 120 X 1/4" (50EA/CA)"

## (undated) DEVICE — SPONGE GAUZESTER 4 X 4 4PLY - (128PK/CA)

## (undated) DEVICE — DRAPE UNDER BUTTOCKS FLUID - (20/CA)

## (undated) DEVICE — PAD LAP STERILE 18 X 18 - (5/PK 40PK/CA)

## (undated) DEVICE — RESERVOIR SUCTION 100 CC - SILICONE (20EA/CA)

## (undated) DEVICE — TUBE NG SALEM SUMP 16FR (50EA/CA)

## (undated) DEVICE — GLOVE BIOGEL SZ 6 PF LATEX - (50EA/BX 4BX/CA)

## (undated) DEVICE — KIT SIGMOIDOSCOPE W/BULB AND - SUCTION (1/PK 10PK/CA)

## (undated) DEVICE — CHLORAPREP 26 ML APPLICATOR - ORANGE TINT(25/CA)

## (undated) DEVICE — SUTURE 0 PDS-2 CTX 36 INCH - (24/BX)

## (undated) DEVICE — CANISTER SUCTION 3000ML MECHANICAL FILTER AUTO SHUTOFF MEDI-VAC NONSTERILE LF DISP  (40EA/CA)

## (undated) DEVICE — GLOVE BIOGEL INDICATOR SZ 6.5 SURGICAL PF LTX - (50PR/BX 4BX/CA)

## (undated) DEVICE — GOWN SURGICAL XX-LARGE - (28EA/CA) SIRUS NON REINFORCED

## (undated) DEVICE — ELECTRODE DUAL RETURN W/ CORD - (50/PK)

## (undated) DEVICE — LEGGING LITHOTOMY 31 X 48 IN - (2EA/PK 20PK/CA)

## (undated) DEVICE — COVER TIP ENDOWRIST HOT SHEAR - (10EA/BX) DA VINCI

## (undated) DEVICE — FORCEPS FENESTRATED BIPOLAR (14UN/EA)

## (undated) DEVICE — GVL 4 STAT DISPOSABLE - (10/BX)

## (undated) DEVICE — GLOVE BIOGEL SZ 7 SURGICAL PF LTX - (50PR/BX 4BX/CA)

## (undated) DEVICE — TOWELS CLOTH SURGICAL - (4/PK 20PK/CA)

## (undated) DEVICE — DRAPE COLUMN  BOX OF 20

## (undated) DEVICE — LIGASURE TISSUE FUSION  - SINGLE USE (6/CA)

## (undated) DEVICE — BOVIE  BLADE 6 EXTENDED - (50/PK)

## (undated) DEVICE — SET LEADWIRE 5 LEAD BEDSIDE DISPOSABLE ECG (1SET OF 5/EA)

## (undated) DEVICE — SLEEVE VASO CALF MED - (10PR/CA)

## (undated) DEVICE — TUBING CLEARLINK DUO-VENT - C-FLO (48EA/CA)

## (undated) DEVICE — SUTURE 2-0 SILK SH (36PK/BX)

## (undated) DEVICE — DRAPE STRLE REG TOWEL 18X24 - (10/BX 4BX/CA)"

## (undated) DEVICE — STAPLER CONTOUR CURVED GREEN 4.8MM W/STAPLE (3EA/BX)

## (undated) DEVICE — LACTATED RINGERS INJ 1000 ML - (14EA/CA 60CA/PF)

## (undated) DEVICE — SYRINGE ASEPTO - (50EA/CA

## (undated) DEVICE — SENSOR OXIMETER ADULT SPO2 RD SET (20EA/BX)

## (undated) DEVICE — SUTURE 3-0 ETHILON FS-1 - (36/BX) 30 INCH

## (undated) DEVICE — SUTURE 1 PDS II PLUS TP-1 - (12PK/BX)

## (undated) DEVICE — SPONGE DRAIN 4 X 4IN 6-PLY - (2/PK25PK/BX12BX/CS)

## (undated) DEVICE — GRAFT MESH SEPRAFILM PRO PACK - 5/BX CONTAINS 6 3X5 PIECES

## (undated) DEVICE — SET TUBING PNEUMOCLEAR HIGH FLOW SMOKE EVACUATION (10EA/BX)

## (undated) DEVICE — COVER LIGHT HANDLE ALC PLUS DISP (18EA/BX)

## (undated) DEVICE — MEDICINE CUP STERILE 2 OZ - (100/CA)

## (undated) DEVICE — DRAPE ARM  BOX OF 20

## (undated) DEVICE — GLOVE BIOGEL INDICATOR SZ 7SURGICAL PF LTX - (50/BX 4BX/CA)

## (undated) DEVICE — SYRINGE 30 ML LL (56/BX)

## (undated) DEVICE — TRAY CATHETER FOLEY URINE METER W/STATLOCK 350ML (10EA/CA)

## (undated) DEVICE — GRASPER TIP UP FENESTRATED XI 10X'S REUSABLE (10UN/EA)

## (undated) DEVICE — SEAL CANNULA STAPLER 12 MM (10EA/BX)

## (undated) DEVICE — SUTURE 3-0 VICRYL PLUS SH - 8X 18 INCH (12/BX)

## (undated) DEVICE — TROCAR 5X100 NON BLADED Z-TH - READ KII (6/BX)

## (undated) DEVICE — SUTURE 0 PDS CT-2 (36PK/BX)

## (undated) DEVICE — SLEEVE, VASO, THIGH, MED

## (undated) DEVICE — SUTURE GENERAL